# Patient Record
Sex: FEMALE | Race: WHITE | NOT HISPANIC OR LATINO | Employment: FULL TIME | URBAN - METROPOLITAN AREA
[De-identification: names, ages, dates, MRNs, and addresses within clinical notes are randomized per-mention and may not be internally consistent; named-entity substitution may affect disease eponyms.]

---

## 2021-01-19 ENCOUNTER — OFFICE VISIT (OUTPATIENT)
Dept: OBSTETRICS AND GYNECOLOGY | Facility: CLINIC | Age: 41
End: 2021-01-19
Payer: COMMERCIAL

## 2021-01-19 VITALS — WEIGHT: 109.19 LBS | BODY MASS INDEX: 21.44 KG/M2 | HEIGHT: 60 IN

## 2021-01-19 DIAGNOSIS — Z01.419 ENCOUNTER FOR ANNUAL ROUTINE GYNECOLOGICAL EXAMINATION: Primary | ICD-10-CM

## 2021-01-19 DIAGNOSIS — Z30.431 ENCOUNTER FOR ROUTINE CHECKING OF INTRAUTERINE CONTRACEPTIVE DEVICE (IUD): ICD-10-CM

## 2021-01-19 DIAGNOSIS — R92.30 DENSE BREAST TISSUE ON MAMMOGRAM: ICD-10-CM

## 2021-01-19 DIAGNOSIS — N63.25 BREAST LUMP ON LEFT SIDE AT 9 O'CLOCK POSITION: ICD-10-CM

## 2021-01-19 PROBLEM — J45.20 MILD INTERMITTENT ASTHMA WITHOUT COMPLICATION: Status: ACTIVE | Noted: 2021-01-19

## 2021-01-19 PROCEDURE — 87624 HPV HI-RISK TYP POOLED RSLT: CPT

## 2021-01-19 PROCEDURE — 88175 CYTOPATH C/V AUTO FLUID REDO: CPT

## 2021-01-19 PROCEDURE — 99999 PR PBB SHADOW E&M-NEW PATIENT-LVL III: CPT | Mod: PBBFAC,,, | Performed by: OBSTETRICS & GYNECOLOGY

## 2021-01-19 PROCEDURE — 1126F PR PAIN SEVERITY QUANTIFIED, NO PAIN PRESENT: ICD-10-PCS | Mod: S$GLB,,, | Performed by: OBSTETRICS & GYNECOLOGY

## 2021-01-19 PROCEDURE — 99386 PR PREVENTIVE VISIT,NEW,40-64: ICD-10-PCS | Mod: S$GLB,,, | Performed by: OBSTETRICS & GYNECOLOGY

## 2021-01-19 PROCEDURE — 3008F BODY MASS INDEX DOCD: CPT | Mod: CPTII,S$GLB,, | Performed by: OBSTETRICS & GYNECOLOGY

## 2021-01-19 PROCEDURE — 3008F PR BODY MASS INDEX (BMI) DOCUMENTED: ICD-10-PCS | Mod: CPTII,S$GLB,, | Performed by: OBSTETRICS & GYNECOLOGY

## 2021-01-19 PROCEDURE — 1126F AMNT PAIN NOTED NONE PRSNT: CPT | Mod: S$GLB,,, | Performed by: OBSTETRICS & GYNECOLOGY

## 2021-01-19 PROCEDURE — 99999 PR PBB SHADOW E&M-NEW PATIENT-LVL III: ICD-10-PCS | Mod: PBBFAC,,, | Performed by: OBSTETRICS & GYNECOLOGY

## 2021-01-19 PROCEDURE — 99386 PREV VISIT NEW AGE 40-64: CPT | Mod: S$GLB,,, | Performed by: OBSTETRICS & GYNECOLOGY

## 2021-01-19 RX ORDER — MULTIVITAMIN
1 TABLET ORAL DAILY
COMMUNITY

## 2021-01-19 RX ORDER — LORAZEPAM 0.5 MG/1
0.5 TABLET ORAL DAILY PRN
COMMUNITY
End: 2023-04-14

## 2021-01-25 LAB
HPV HR 12 DNA SPEC QL NAA+PROBE: NEGATIVE
HPV16 AG SPEC QL: NEGATIVE
HPV18 DNA SPEC QL NAA+PROBE: NEGATIVE

## 2021-02-11 LAB
FINAL PATHOLOGIC DIAGNOSIS: NORMAL
Lab: NORMAL

## 2021-07-30 ENCOUNTER — PATIENT MESSAGE (OUTPATIENT)
Dept: OBSTETRICS AND GYNECOLOGY | Facility: CLINIC | Age: 41
End: 2021-07-30

## 2021-07-30 ENCOUNTER — TELEPHONE (OUTPATIENT)
Dept: OBSTETRICS AND GYNECOLOGY | Facility: CLINIC | Age: 41
End: 2021-07-30

## 2021-10-27 ENCOUNTER — OFFICE VISIT (OUTPATIENT)
Dept: INTERNAL MEDICINE | Facility: CLINIC | Age: 41
End: 2021-10-27
Payer: COMMERCIAL

## 2021-10-27 VITALS
HEIGHT: 60 IN | SYSTOLIC BLOOD PRESSURE: 118 MMHG | HEART RATE: 92 BPM | WEIGHT: 109.38 LBS | OXYGEN SATURATION: 98 % | BODY MASS INDEX: 21.47 KG/M2 | DIASTOLIC BLOOD PRESSURE: 78 MMHG

## 2021-10-27 DIAGNOSIS — Z01.89 ROUTINE LAB DRAW: ICD-10-CM

## 2021-10-27 DIAGNOSIS — Z12.31 ENCOUNTER FOR SCREENING MAMMOGRAM FOR MALIGNANT NEOPLASM OF BREAST: ICD-10-CM

## 2021-10-27 DIAGNOSIS — Z13.220 SCREENING CHOLESTEROL LEVEL: ICD-10-CM

## 2021-10-27 DIAGNOSIS — Z11.59 ENCOUNTER FOR HEPATITIS C SCREENING TEST FOR LOW RISK PATIENT: ICD-10-CM

## 2021-10-27 DIAGNOSIS — F41.1 GENERALIZED ANXIETY DISORDER: ICD-10-CM

## 2021-10-27 DIAGNOSIS — J45.20 MILD INTERMITTENT ASTHMA WITHOUT COMPLICATION: ICD-10-CM

## 2021-10-27 DIAGNOSIS — Z11.4 ENCOUNTER FOR SCREENING FOR HIV: ICD-10-CM

## 2021-10-27 DIAGNOSIS — Z00.00 ANNUAL PHYSICAL EXAM: Primary | ICD-10-CM

## 2021-10-27 PROCEDURE — 3074F SYST BP LT 130 MM HG: CPT | Mod: CPTII,S$GLB,, | Performed by: NURSE PRACTITIONER

## 2021-10-27 PROCEDURE — 1160F PR REVIEW ALL MEDS BY PRESCRIBER/CLIN PHARMACIST DOCUMENTED: ICD-10-PCS | Mod: CPTII,S$GLB,, | Performed by: NURSE PRACTITIONER

## 2021-10-27 PROCEDURE — 3008F BODY MASS INDEX DOCD: CPT | Mod: CPTII,S$GLB,, | Performed by: NURSE PRACTITIONER

## 2021-10-27 PROCEDURE — 99386 PR PREVENTIVE VISIT,NEW,40-64: ICD-10-PCS | Mod: S$GLB,,, | Performed by: NURSE PRACTITIONER

## 2021-10-27 PROCEDURE — 1159F MED LIST DOCD IN RCRD: CPT | Mod: CPTII,S$GLB,, | Performed by: NURSE PRACTITIONER

## 2021-10-27 PROCEDURE — 1159F PR MEDICATION LIST DOCUMENTED IN MEDICAL RECORD: ICD-10-PCS | Mod: CPTII,S$GLB,, | Performed by: NURSE PRACTITIONER

## 2021-10-27 PROCEDURE — 99999 PR PBB SHADOW E&M-EST. PATIENT-LVL IV: CPT | Mod: PBBFAC,,, | Performed by: NURSE PRACTITIONER

## 2021-10-27 PROCEDURE — 3078F DIAST BP <80 MM HG: CPT | Mod: CPTII,S$GLB,, | Performed by: NURSE PRACTITIONER

## 2021-10-27 PROCEDURE — 3008F PR BODY MASS INDEX (BMI) DOCUMENTED: ICD-10-PCS | Mod: CPTII,S$GLB,, | Performed by: NURSE PRACTITIONER

## 2021-10-27 PROCEDURE — 3074F PR MOST RECENT SYSTOLIC BLOOD PRESSURE < 130 MM HG: ICD-10-PCS | Mod: CPTII,S$GLB,, | Performed by: NURSE PRACTITIONER

## 2021-10-27 PROCEDURE — 3078F PR MOST RECENT DIASTOLIC BLOOD PRESSURE < 80 MM HG: ICD-10-PCS | Mod: CPTII,S$GLB,, | Performed by: NURSE PRACTITIONER

## 2021-10-27 PROCEDURE — 99999 PR PBB SHADOW E&M-EST. PATIENT-LVL IV: ICD-10-PCS | Mod: PBBFAC,,, | Performed by: NURSE PRACTITIONER

## 2021-10-27 PROCEDURE — 99386 PREV VISIT NEW AGE 40-64: CPT | Mod: S$GLB,,, | Performed by: NURSE PRACTITIONER

## 2021-10-27 PROCEDURE — 1160F RVW MEDS BY RX/DR IN RCRD: CPT | Mod: CPTII,S$GLB,, | Performed by: NURSE PRACTITIONER

## 2021-10-27 RX ORDER — ALBUTEROL SULFATE 90 UG/1
2 AEROSOL, METERED RESPIRATORY (INHALATION) EVERY 6 HOURS PRN
Qty: 18 G | Refills: 0 | Status: SHIPPED | OUTPATIENT
Start: 2021-10-27 | End: 2022-10-28 | Stop reason: SDUPTHER

## 2021-11-06 ENCOUNTER — LAB VISIT (OUTPATIENT)
Dept: LAB | Facility: HOSPITAL | Age: 41
End: 2021-11-06
Payer: COMMERCIAL

## 2021-11-06 DIAGNOSIS — Z11.59 ENCOUNTER FOR HEPATITIS C SCREENING TEST FOR LOW RISK PATIENT: ICD-10-CM

## 2021-11-06 DIAGNOSIS — Z00.00 ANNUAL PHYSICAL EXAM: ICD-10-CM

## 2021-11-06 DIAGNOSIS — Z11.4 ENCOUNTER FOR SCREENING FOR HIV: ICD-10-CM

## 2021-11-06 DIAGNOSIS — Z13.220 SCREENING CHOLESTEROL LEVEL: ICD-10-CM

## 2021-11-06 DIAGNOSIS — Z01.89 ROUTINE LAB DRAW: ICD-10-CM

## 2021-11-06 LAB
ALBUMIN SERPL BCP-MCNC: 4.3 G/DL (ref 3.5–5.2)
ALP SERPL-CCNC: 56 U/L (ref 55–135)
ALT SERPL W/O P-5'-P-CCNC: 10 U/L (ref 10–44)
ANION GAP SERPL CALC-SCNC: 11 MMOL/L (ref 8–16)
AST SERPL-CCNC: 14 U/L (ref 10–40)
BASOPHILS # BLD AUTO: 0.05 K/UL (ref 0–0.2)
BASOPHILS NFR BLD: 1.2 % (ref 0–1.9)
BILIRUB SERPL-MCNC: 0.9 MG/DL (ref 0.1–1)
BUN SERPL-MCNC: 11 MG/DL (ref 6–20)
CALCIUM SERPL-MCNC: 9.5 MG/DL (ref 8.7–10.5)
CHLORIDE SERPL-SCNC: 108 MMOL/L (ref 95–110)
CHOLEST SERPL-MCNC: 158 MG/DL (ref 120–199)
CHOLEST/HDLC SERPL: 2.8 {RATIO} (ref 2–5)
CO2 SERPL-SCNC: 22 MMOL/L (ref 23–29)
CREAT SERPL-MCNC: 0.7 MG/DL (ref 0.5–1.4)
DIFFERENTIAL METHOD: ABNORMAL
EOSINOPHIL # BLD AUTO: 0.3 K/UL (ref 0–0.5)
EOSINOPHIL NFR BLD: 6.9 % (ref 0–8)
ERYTHROCYTE [DISTWIDTH] IN BLOOD BY AUTOMATED COUNT: 12.2 % (ref 11.5–14.5)
EST. GFR  (AFRICAN AMERICAN): >60 ML/MIN/1.73 M^2
EST. GFR  (NON AFRICAN AMERICAN): >60 ML/MIN/1.73 M^2
GLUCOSE SERPL-MCNC: 84 MG/DL (ref 70–110)
HCT VFR BLD AUTO: 40.5 % (ref 37–48.5)
HDLC SERPL-MCNC: 56 MG/DL (ref 40–75)
HDLC SERPL: 35.4 % (ref 20–50)
HGB BLD-MCNC: 13.4 G/DL (ref 12–16)
IMM GRANULOCYTES # BLD AUTO: 0.01 K/UL (ref 0–0.04)
IMM GRANULOCYTES NFR BLD AUTO: 0.2 % (ref 0–0.5)
LDLC SERPL CALC-MCNC: 91.6 MG/DL (ref 63–159)
LYMPHOCYTES # BLD AUTO: 1.8 K/UL (ref 1–4.8)
LYMPHOCYTES NFR BLD: 42.6 % (ref 18–48)
MCH RBC QN AUTO: 30.6 PG (ref 27–31)
MCHC RBC AUTO-ENTMCNC: 33.1 G/DL (ref 32–36)
MCV RBC AUTO: 93 FL (ref 82–98)
MONOCYTES # BLD AUTO: 0.4 K/UL (ref 0.3–1)
MONOCYTES NFR BLD: 8.4 % (ref 4–15)
NEUTROPHILS # BLD AUTO: 1.7 K/UL (ref 1.8–7.7)
NEUTROPHILS NFR BLD: 40.7 % (ref 38–73)
NONHDLC SERPL-MCNC: 102 MG/DL
NRBC BLD-RTO: 0 /100 WBC
PLATELET # BLD AUTO: 277 K/UL (ref 150–450)
PMV BLD AUTO: 9.7 FL (ref 9.2–12.9)
POTASSIUM SERPL-SCNC: 4.3 MMOL/L (ref 3.5–5.1)
PROT SERPL-MCNC: 7.2 G/DL (ref 6–8.4)
RBC # BLD AUTO: 4.38 M/UL (ref 4–5.4)
SODIUM SERPL-SCNC: 141 MMOL/L (ref 136–145)
TRIGL SERPL-MCNC: 52 MG/DL (ref 30–150)
TSH SERPL DL<=0.005 MIU/L-ACNC: 1.56 UIU/ML (ref 0.4–4)
WBC # BLD AUTO: 4.18 K/UL (ref 3.9–12.7)

## 2021-11-06 PROCEDURE — 36415 COLL VENOUS BLD VENIPUNCTURE: CPT | Performed by: NURSE PRACTITIONER

## 2021-11-06 PROCEDURE — 80053 COMPREHEN METABOLIC PANEL: CPT | Performed by: NURSE PRACTITIONER

## 2021-11-06 PROCEDURE — 86803 HEPATITIS C AB TEST: CPT | Performed by: NURSE PRACTITIONER

## 2021-11-06 PROCEDURE — 87389 HIV-1 AG W/HIV-1&-2 AB AG IA: CPT | Performed by: NURSE PRACTITIONER

## 2021-11-06 PROCEDURE — 85025 COMPLETE CBC W/AUTO DIFF WBC: CPT | Performed by: NURSE PRACTITIONER

## 2021-11-06 PROCEDURE — 84443 ASSAY THYROID STIM HORMONE: CPT | Performed by: NURSE PRACTITIONER

## 2021-11-06 PROCEDURE — 80061 LIPID PANEL: CPT | Performed by: NURSE PRACTITIONER

## 2021-11-08 LAB
HCV AB SERPL QL IA: NEGATIVE
HIV 1+2 AB+HIV1 P24 AG SERPL QL IA: NEGATIVE

## 2021-11-09 ENCOUNTER — TELEPHONE (OUTPATIENT)
Dept: BEHAVIORAL HEALTH | Facility: CLINIC | Age: 41
End: 2021-11-09
Payer: COMMERCIAL

## 2021-11-10 ENCOUNTER — PATIENT MESSAGE (OUTPATIENT)
Dept: BEHAVIORAL HEALTH | Facility: CLINIC | Age: 41
End: 2021-11-10
Payer: COMMERCIAL

## 2021-11-23 ENCOUNTER — HOSPITAL ENCOUNTER (OUTPATIENT)
Dept: RADIOLOGY | Facility: HOSPITAL | Age: 41
Discharge: HOME | End: 2021-11-23
Attending: NURSE PRACTITIONER
Payer: COMMERCIAL

## 2021-11-23 VITALS — HEIGHT: 61 IN | WEIGHT: 111 LBS | BODY MASS INDEX: 20.96 KG/M2

## 2021-11-23 DIAGNOSIS — Z12.31 ENCOUNTER FOR SCREENING MAMMOGRAM FOR MALIGNANT NEOPLASM OF BREAST: ICD-10-CM

## 2021-11-23 PROCEDURE — 77063 BREAST TOMOSYNTHESIS BI: CPT | Mod: 26,,, | Performed by: RADIOLOGY

## 2021-11-23 PROCEDURE — 77067 SCR MAMMO BI INCL CAD: CPT | Mod: 26,,, | Performed by: RADIOLOGY

## 2021-11-23 PROCEDURE — 77067 MAMMO DIGITAL SCREENING BILAT WITH TOMO: ICD-10-PCS | Mod: 26,,, | Performed by: RADIOLOGY

## 2021-11-23 PROCEDURE — 77063 MAMMO DIGITAL SCREENING BILAT WITH TOMO: ICD-10-PCS | Mod: 26,,, | Performed by: RADIOLOGY

## 2021-11-23 PROCEDURE — 77067 SCR MAMMO BI INCL CAD: CPT | Mod: TC

## 2021-12-06 ENCOUNTER — PATIENT MESSAGE (OUTPATIENT)
Dept: BEHAVIORAL HEALTH | Facility: CLINIC | Age: 41
End: 2021-12-06
Payer: COMMERCIAL

## 2021-12-06 ENCOUNTER — TELEPHONE (OUTPATIENT)
Dept: RADIOLOGY | Facility: HOSPITAL | Age: 41
End: 2021-12-06
Payer: COMMERCIAL

## 2021-12-07 ENCOUNTER — PATIENT MESSAGE (OUTPATIENT)
Dept: BEHAVIORAL HEALTH | Facility: CLINIC | Age: 41
End: 2021-12-07
Payer: COMMERCIAL

## 2021-12-08 ENCOUNTER — OFFICE VISIT (OUTPATIENT)
Dept: BEHAVIORAL HEALTH | Facility: CLINIC | Age: 41
End: 2021-12-08
Payer: COMMERCIAL

## 2021-12-08 DIAGNOSIS — F41.1 GENERALIZED ANXIETY DISORDER: Primary | ICD-10-CM

## 2021-12-08 PROCEDURE — 90791 PR PSYCHIATRIC DIAGNOSTIC EVALUATION: ICD-10-PCS | Mod: 95,,, | Performed by: SOCIAL WORKER

## 2021-12-08 PROCEDURE — 90791 PSYCH DIAGNOSTIC EVALUATION: CPT | Mod: 95,,, | Performed by: SOCIAL WORKER

## 2021-12-09 ENCOUNTER — PATIENT MESSAGE (OUTPATIENT)
Dept: BEHAVIORAL HEALTH | Facility: CLINIC | Age: 41
End: 2021-12-09
Payer: COMMERCIAL

## 2021-12-16 ENCOUNTER — TELEPHONE (OUTPATIENT)
Dept: BEHAVIORAL HEALTH | Facility: CLINIC | Age: 41
End: 2021-12-16
Payer: COMMERCIAL

## 2021-12-17 DIAGNOSIS — F41.1 GENERALIZED ANXIETY DISORDER: Primary | ICD-10-CM

## 2021-12-17 RX ORDER — SERTRALINE HYDROCHLORIDE 25 MG/1
TABLET, FILM COATED ORAL
Qty: 30 TABLET | Refills: 2 | Status: SHIPPED | OUTPATIENT
Start: 2021-12-17 | End: 2022-01-12

## 2021-12-20 ENCOUNTER — PATIENT MESSAGE (OUTPATIENT)
Dept: BEHAVIORAL HEALTH | Facility: CLINIC | Age: 41
End: 2021-12-20
Payer: COMMERCIAL

## 2021-12-20 ENCOUNTER — OFFICE VISIT (OUTPATIENT)
Dept: BEHAVIORAL HEALTH | Facility: CLINIC | Age: 41
End: 2021-12-20
Payer: COMMERCIAL

## 2021-12-20 DIAGNOSIS — F41.1 GENERALIZED ANXIETY DISORDER: Primary | ICD-10-CM

## 2021-12-20 PROCEDURE — 90832 PR PSYCHOTHERAPY W/PATIENT, 30 MIN: ICD-10-PCS | Mod: S$GLB,,, | Performed by: SOCIAL WORKER

## 2021-12-20 PROCEDURE — 90832 PSYTX W PT 30 MINUTES: CPT | Mod: S$GLB,,, | Performed by: SOCIAL WORKER

## 2021-12-21 ENCOUNTER — PATIENT MESSAGE (OUTPATIENT)
Dept: BEHAVIORAL HEALTH | Facility: CLINIC | Age: 41
End: 2021-12-21
Payer: COMMERCIAL

## 2022-01-03 ENCOUNTER — OFFICE VISIT (OUTPATIENT)
Dept: BEHAVIORAL HEALTH | Facility: CLINIC | Age: 42
End: 2022-01-03
Payer: COMMERCIAL

## 2022-01-03 ENCOUNTER — HOSPITAL ENCOUNTER (OUTPATIENT)
Dept: RADIOLOGY | Facility: HOSPITAL | Age: 42
Discharge: HOME | End: 2022-01-03
Attending: NURSE PRACTITIONER
Payer: COMMERCIAL

## 2022-01-03 VITALS — BODY MASS INDEX: 20.6 KG/M2 | WEIGHT: 109 LBS

## 2022-01-03 DIAGNOSIS — F41.1 GENERALIZED ANXIETY DISORDER: Primary | ICD-10-CM

## 2022-01-03 DIAGNOSIS — R92.8 ABNORMAL FINDING ON BREAST IMAGING: ICD-10-CM

## 2022-01-03 PROCEDURE — 76642 ULTRASOUND BREAST LIMITED: CPT | Mod: TC,50

## 2022-01-03 PROCEDURE — 76642 US BREAST BILATERAL LIMITED: ICD-10-PCS | Mod: 26,RT,, | Performed by: RADIOLOGY

## 2022-01-03 PROCEDURE — 76642 ULTRASOUND BREAST LIMITED: CPT | Mod: 26,LT,, | Performed by: RADIOLOGY

## 2022-01-03 PROCEDURE — 90832 PR PSYCHOTHERAPY W/PATIENT, 30 MIN: ICD-10-PCS | Mod: S$GLB,,, | Performed by: SOCIAL WORKER

## 2022-01-03 PROCEDURE — 77061 BREAST TOMOSYNTHESIS UNI: CPT | Mod: 26,RT,, | Performed by: RADIOLOGY

## 2022-01-03 PROCEDURE — 77065 MAMMO DIGITAL DIAGNOSTIC RIGHT WITH TOMO: ICD-10-PCS | Mod: 26,RT,, | Performed by: RADIOLOGY

## 2022-01-03 PROCEDURE — 90832 PSYTX W PT 30 MINUTES: CPT | Mod: S$GLB,,, | Performed by: SOCIAL WORKER

## 2022-01-03 PROCEDURE — 77065 DX MAMMO INCL CAD UNI: CPT | Mod: TC,RT

## 2022-01-03 PROCEDURE — 77061 MAMMO DIGITAL DIAGNOSTIC RIGHT WITH TOMO: ICD-10-PCS | Mod: 26,RT,, | Performed by: RADIOLOGY

## 2022-01-03 PROCEDURE — 76642 ULTRASOUND BREAST LIMITED: CPT | Mod: 26,RT,, | Performed by: RADIOLOGY

## 2022-01-03 PROCEDURE — 77065 DX MAMMO INCL CAD UNI: CPT | Mod: 26,RT,, | Performed by: RADIOLOGY

## 2022-01-03 NOTE — PROGRESS NOTES
"Individual Psychotherapy (LCSW/PhD)  Tobi Dennies,  1/3/2022    Site:  [facility]         Therapeutic Intervention: Met with patient for individual psychotherapy.    Chief complaint/reason for encounter: anxiety     Interval history and content of current session: Pt reports her parents moved out one day without telling her before hand.  She felt somewhat upset about this.  She shares she doesn't like being alone and so not having them there is a change.  She shares she has been taking zoloft since it was prescribed but that it has caused heart palpitations.  She reports they are not as bad as they were on lexapro but that they are disturbing and do not seem to be getting better.  She shares she set the goal for the new year to move more and got a hula hoop to help with this.  She reports she has cut back on caffeine use and is back to her sleep hygiene routine of only using bed for sleep.  She would like to try another medication.  She reports she has been doing a meditation when she gets home of imagining herself somewhere calming which she finds helpful.  We discussed changing "what if " statements to a positive what if scenario.  SW will discuss meds with Dr. Estrella    Treatment plan:  · Target symptoms: anxiety   · Why chosen therapy is appropriate versus another modality: relevant to diagnosis, patient responds to this modality  · Outcome monitoring methods: self-report, observation  · Therapeutic intervention type: insight oriented psychotherapy, behavior modifying psychotherapy, supportive psychotherapy    Risk parameters:  Patient reports no suicidal ideation  Patient reports no homicidal ideation  Patient reports no self-injurious behavior  Patient reports no violent behavior    Verbal deficits: None    Patient's response to intervention:  The patient's response to intervention is accepting.    Progress toward goals and other mental status changes:  The patient's progress toward goals is fair " .    Diagnosis:     ICD-10-CM ICD-9-CM   1. Generalized anxiety disorder  F41.1 300.02       Plan: Pt plans to continue individual psychotherapy    Return to clinic: 2 weeks    Length of Service (minutes): 30

## 2022-01-03 NOTE — Clinical Note
Dr. Mccartney,  Please see my addendum from today's I meeting.  If you agree with this plan, I will pend Buspar in a separate refill encounter.  Please reach out to me with any questions.  Jo Estrella

## 2022-01-04 ENCOUNTER — TELEPHONE (OUTPATIENT)
Dept: BEHAVIORAL HEALTH | Facility: CLINIC | Age: 42
End: 2022-01-04
Payer: COMMERCIAL

## 2022-01-04 NOTE — PROGRESS NOTES
VEGAW scheduled pt on 1/19/2022 for follow up virtual visit at 2:00pm with Sugar Griffiths LCSW.

## 2022-01-07 DIAGNOSIS — F41.1 GENERALIZED ANXIETY DISORDER: Primary | ICD-10-CM

## 2022-01-07 NOTE — PROGRESS NOTES
Patient discussed during [facility] meeting today.  Patient with history of palpitations on Lexapro.  She has now been on Zoloft 12.5mg daily x 1 month and continues to have palpitations.  Will stop Zoloft and now try Buspar.  Would recommend prescribing Buspar 5mg three times a day with the goal of taking at least twice a day.  She can use the 3rd dose as needed for days with high anxiety.  She will continue therapy with [facility] LCSW.      Time: 12 minutes

## 2022-01-10 RX ORDER — BUSPIRONE HYDROCHLORIDE 5 MG/1
5 TABLET ORAL 3 TIMES DAILY
Qty: 90 TABLET | Refills: 2 | Status: SHIPPED | OUTPATIENT
Start: 2022-01-10 | End: 2022-02-25 | Stop reason: SDUPTHER

## 2022-01-11 ENCOUNTER — TELEPHONE (OUTPATIENT)
Dept: BEHAVIORAL HEALTH | Facility: CLINIC | Age: 42
End: 2022-01-11
Payer: COMMERCIAL

## 2022-01-11 NOTE — TELEPHONE ENCOUNTER
LCSW called pt to let her know that Dr. Mccartney called in the prescription for buspirone as recommended by Dr. Estrella.  SW provided brief psychoeducation as instructed by Dr. Estrella for pt to take 2 buspirone around her anxious times of day and to take the 3rd dose prn.  Advised pt to call or message with any concerns.  LCSW will see pt next week for therapy.

## 2022-01-12 ENCOUNTER — OFFICE VISIT (OUTPATIENT)
Dept: INTERNAL MEDICINE | Facility: CLINIC | Age: 42
End: 2022-01-12
Payer: COMMERCIAL

## 2022-01-12 VITALS
BODY MASS INDEX: 20.77 KG/M2 | SYSTOLIC BLOOD PRESSURE: 116 MMHG | TEMPERATURE: 98 F | DIASTOLIC BLOOD PRESSURE: 70 MMHG | RESPIRATION RATE: 16 BRPM | OXYGEN SATURATION: 99 % | HEIGHT: 61 IN | WEIGHT: 110 LBS | HEART RATE: 100 BPM

## 2022-01-12 DIAGNOSIS — J45.20 MILD INTERMITTENT ASTHMA WITHOUT COMPLICATION: ICD-10-CM

## 2022-01-12 DIAGNOSIS — Z76.89 ENCOUNTER TO ESTABLISH CARE WITH NEW DOCTOR: Primary | ICD-10-CM

## 2022-01-12 DIAGNOSIS — F41.1 GENERALIZED ANXIETY DISORDER: ICD-10-CM

## 2022-01-12 DIAGNOSIS — G47.00 INSOMNIA, UNSPECIFIED TYPE: ICD-10-CM

## 2022-01-12 DIAGNOSIS — F90.9 ATTENTION DEFICIT HYPERACTIVITY DISORDER (ADHD), UNSPECIFIED ADHD TYPE: ICD-10-CM

## 2022-01-12 DIAGNOSIS — J30.2 SEASONAL ALLERGIES: ICD-10-CM

## 2022-01-12 PROCEDURE — 99386 PR PREVENTIVE VISIT,NEW,40-64: ICD-10-PCS | Mod: S$GLB,,, | Performed by: STUDENT IN AN ORGANIZED HEALTH CARE EDUCATION/TRAINING PROGRAM

## 2022-01-12 PROCEDURE — 99386 PREV VISIT NEW AGE 40-64: CPT | Mod: S$GLB,,, | Performed by: STUDENT IN AN ORGANIZED HEALTH CARE EDUCATION/TRAINING PROGRAM

## 2022-01-12 PROCEDURE — 99999 PR PBB SHADOW E&M-EST. PATIENT-LVL V: CPT | Mod: PBBFAC,,, | Performed by: STUDENT IN AN ORGANIZED HEALTH CARE EDUCATION/TRAINING PROGRAM

## 2022-01-12 PROCEDURE — 3008F PR BODY MASS INDEX (BMI) DOCUMENTED: ICD-10-PCS | Mod: CPTII,S$GLB,, | Performed by: STUDENT IN AN ORGANIZED HEALTH CARE EDUCATION/TRAINING PROGRAM

## 2022-01-12 PROCEDURE — 3074F SYST BP LT 130 MM HG: CPT | Mod: CPTII,S$GLB,, | Performed by: STUDENT IN AN ORGANIZED HEALTH CARE EDUCATION/TRAINING PROGRAM

## 2022-01-12 PROCEDURE — 1159F MED LIST DOCD IN RCRD: CPT | Mod: CPTII,S$GLB,, | Performed by: STUDENT IN AN ORGANIZED HEALTH CARE EDUCATION/TRAINING PROGRAM

## 2022-01-12 PROCEDURE — 3078F DIAST BP <80 MM HG: CPT | Mod: CPTII,S$GLB,, | Performed by: STUDENT IN AN ORGANIZED HEALTH CARE EDUCATION/TRAINING PROGRAM

## 2022-01-12 PROCEDURE — 1159F PR MEDICATION LIST DOCUMENTED IN MEDICAL RECORD: ICD-10-PCS | Mod: CPTII,S$GLB,, | Performed by: STUDENT IN AN ORGANIZED HEALTH CARE EDUCATION/TRAINING PROGRAM

## 2022-01-12 PROCEDURE — 1160F PR REVIEW ALL MEDS BY PRESCRIBER/CLIN PHARMACIST DOCUMENTED: ICD-10-PCS | Mod: CPTII,S$GLB,, | Performed by: STUDENT IN AN ORGANIZED HEALTH CARE EDUCATION/TRAINING PROGRAM

## 2022-01-12 PROCEDURE — 3008F BODY MASS INDEX DOCD: CPT | Mod: CPTII,S$GLB,, | Performed by: STUDENT IN AN ORGANIZED HEALTH CARE EDUCATION/TRAINING PROGRAM

## 2022-01-12 PROCEDURE — 3078F PR MOST RECENT DIASTOLIC BLOOD PRESSURE < 80 MM HG: ICD-10-PCS | Mod: CPTII,S$GLB,, | Performed by: STUDENT IN AN ORGANIZED HEALTH CARE EDUCATION/TRAINING PROGRAM

## 2022-01-12 PROCEDURE — 99999 PR PBB SHADOW E&M-EST. PATIENT-LVL V: ICD-10-PCS | Mod: PBBFAC,,, | Performed by: STUDENT IN AN ORGANIZED HEALTH CARE EDUCATION/TRAINING PROGRAM

## 2022-01-12 PROCEDURE — 1160F RVW MEDS BY RX/DR IN RCRD: CPT | Mod: CPTII,S$GLB,, | Performed by: STUDENT IN AN ORGANIZED HEALTH CARE EDUCATION/TRAINING PROGRAM

## 2022-01-12 PROCEDURE — 3074F PR MOST RECENT SYSTOLIC BLOOD PRESSURE < 130 MM HG: ICD-10-PCS | Mod: CPTII,S$GLB,, | Performed by: STUDENT IN AN ORGANIZED HEALTH CARE EDUCATION/TRAINING PROGRAM

## 2022-01-12 RX ORDER — TRAZODONE HYDROCHLORIDE 50 MG/1
50 TABLET ORAL NIGHTLY PRN
Qty: 30 TABLET | Refills: 11 | Status: SHIPPED | OUTPATIENT
Start: 2022-01-12 | End: 2023-07-13

## 2022-01-12 NOTE — PROGRESS NOTES
INTERNAL MEDICINE INITIAL VISIT NOTE      CHIEF COMPLAINT     Chief Complaint   Patient presents with    Results       ASSESSMENT/PLAN     Tobi Dennies is a 41 y.o. female with asthma, allergies, mild anxiety, insomnia here to establish care.     1. Encounter to establish care with new doctor  All previous labs, imaging, and notes reviewed up to the time point of this note. Normal labs, slightly low ANC 1700, but no need to repeat at this time.     2. Mild intermittent asthma without complication  Well controlled with rare use of albuterol    3. Seasonal allergies  PRN use of antihistamines.     4. Generalized anxiety disorder  Recently started on buspar 5 mg BID with 1 daily pill for PRN use. Did not tolerate zoloft or lexapro. LYLY-9 score 9 (mild)    5. Insomnia, unspecified type  Not sleeping well, likely 2/2 to anxiety.   - traZODone (DESYREL) 50 MG tablet; Take 1 tablet (50 mg total) by mouth nightly as needed for Insomnia.  Dispense: 30 tablet; Refill: 11    6. Attention deficit hyperactivity disorder (ADHD), unspecified ADHD type  Concerned that ADHD may be a leading factor to her anxiety and trouble focusing at work. Need for evaluation for testing.   - Ambulatory referral/consult to Psychiatry; Future      HM reviewed and discussed in detail with the patient.     RTC in 1 yr, sooner if needed and depending on labs.    HPI     Tobi Dennies is a 41 y.o. female who presents with anxiety, intermittent asthma, here today to establish care.    Last PCP was essentially her ENT due to issues with allergies and asthma in the past. Has since retired.     Birth control  - had IUD placed late 2017. Is due for getting it replaced late 2024. Gets some spotting when stressed or when daughter is on her period.     Allergies - seasonal. If it gets bad, can lead to asthma flare. Will take steroid injection and prednisone as well as z-pack.     Asthma - triggered by allergies and infections as well as anxiety. Has never  "been hospitalized overnight for asthma. Typically once a year maximum. Uses albuterol once a month on average.     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 No panic attacks. Has been seeing a psychiatrist for just a few months, previous PCP was just prescribing ativan for anxiety.   Had SE to zoloft. Just started on buspar yesterday. LYLY-7 score 9.     Diet: ok  Exercise: "I need to do more"  Sleep - trouble going to sleep, relaxing an issue. Wakes up in the middle of the night. Took melatonin for a while, but now issues with waking up at 2 am.   BM: regular   Sunscreen: yes  Safety: yes     Past Medical History:  Past Medical History:   Diagnosis Date    Anxiety 2010    Asthma     age 4       Past Surgical History:  Past Surgical History:   Procedure Laterality Date    cyst removal of scalp         Home Medications:  Prior to Admission medications    Medication Sig Start Date End Date Taking? Authorizing Provider   albuterol (VENTOLIN HFA) 90 mcg/actuation inhaler Inhale 2 puffs into the lungs every 6 (six) hours as needed for Wheezing. Rescue 10/27/21 10/27/22  Beatriz Reddy NP   busPIRone (BUSPAR) 5 MG Tab Take 1 tablet (5 mg total) by mouth 3 (three) times daily. 1/10/22 1/10/23  Rochelle Mccartney MD   levonorgestreL (MIRENA) 20 mcg/24 hours (6 yrs) 52 mg IUD 1 each by Intrauterine route once.    Historical Provider   LORazepam (ATIVAN) 0.5 MG tablet Take 0.5 mg by mouth daily as needed for Anxiety.    Historical Provider   multivitamin (THERAGRAN) per tablet Take 1 tablet by mouth once daily.    Historical Provider   sertraline (ZOLOFT) 25 MG tablet Take 12.5mg (1/2 tablet) by mouth daily x 1 week and then increase to 25mg (1 tablet) daily. 12/17/21   Rochelle Mccartney MD       Allergies:  Review of patient's allergies indicates:  No Known Allergies    Family History:  Family " "History   Adopted: Yes   Problem Relation Age of Onset    ADD / ADHD Daughter 8       Social History:  Social History     Tobacco Use    Smoking status: Never Smoker    Smokeless tobacco: Never Used   Substance Use Topics    Alcohol use: Yes     Alcohol/week: 2.0 standard drinks     Types: 2 Standard drinks or equivalent per week     Comment: socially; twice per month    Drug use: Never       Review of Systems:  Review of Systems   Constitutional: Negative for fever and unexpected weight change.   HENT: Negative for sinus pressure and sore throat.    Eyes: Negative for visual disturbance.   Respiratory: Negative for cough and shortness of breath.    Cardiovascular: Negative for chest pain and palpitations.   Gastrointestinal: Negative for constipation, diarrhea, nausea and vomiting.   Endocrine: Negative for polyuria.   Genitourinary: Negative for dysuria and urgency.   Musculoskeletal: Negative for arthralgias and myalgias.   Skin: Negative for rash.   Allergic/Immunologic: Negative for environmental allergies.   Neurological: Negative for dizziness, light-headedness and headaches.   Hematological: Does not bruise/bleed easily.   Psychiatric/Behavioral: Positive for sleep disturbance. Negative for agitation and decreased concentration. The patient is nervous/anxious.        Health Maintenance:   Needs to schedule covid booster      PHYSICAL EXAM     /70 (BP Location: Right arm, Patient Position: Sitting, BP Method: Small (Manual))   Pulse 100   Temp 98 °F (36.7 °C) (Oral)   Resp 16   Ht 5' 1" (1.549 m)   Wt 49.9 kg (110 lb 0.2 oz)   SpO2 99%   BMI 20.79 kg/m²     GEN - A+OX4, NAD healthy and well appearing middle aged woman   HEENT - PERRL, EOMI, OP clear  Neck - No thyromegaly or thyroid masses felt.  No cervical lymphadenopathy appreciated.  CV - RRR, no m/r/g   Chest - CTAB, no wheezing, crackles, or rhonchi   Abd - S/NT/ND/+BS.   Ext - 2+BDP. No C/C/E.  LN - No LAD appreciated.  Skin - Normal " color and texture, no rash, no skin lesions.      LABS     Lab Results   Component Value Date    WBC 4.18 11/06/2021    HGB 13.4 11/06/2021    HCT 40.5 11/06/2021    MCV 93 11/06/2021     11/06/2021     Sodium   Date Value Ref Range Status   11/06/2021 141 136 - 145 mmol/L Final     Potassium   Date Value Ref Range Status   11/06/2021 4.3 3.5 - 5.1 mmol/L Final     Chloride   Date Value Ref Range Status   11/06/2021 108 95 - 110 mmol/L Final     CO2   Date Value Ref Range Status   11/06/2021 22 (L) 23 - 29 mmol/L Final     Glucose   Date Value Ref Range Status   11/06/2021 84 70 - 110 mg/dL Final     BUN   Date Value Ref Range Status   11/06/2021 11 6 - 20 mg/dL Final     Creatinine   Date Value Ref Range Status   11/06/2021 0.7 0.5 - 1.4 mg/dL Final     Calcium   Date Value Ref Range Status   11/06/2021 9.5 8.7 - 10.5 mg/dL Final     Total Protein   Date Value Ref Range Status   11/06/2021 7.2 6.0 - 8.4 g/dL Final     Albumin   Date Value Ref Range Status   11/06/2021 4.3 3.5 - 5.2 g/dL Final     Total Bilirubin   Date Value Ref Range Status   11/06/2021 0.9 0.1 - 1.0 mg/dL Final     Comment:     For infants and newborns, interpretation of results should be based  on gestational age, weight and in agreement with clinical  observations.    Premature Infant recommended reference ranges:  Up to 24 hours.............<8.0 mg/dL  Up to 48 hours............<12.0 mg/dL  3-5 days..................<15.0 mg/dL  6-29 days.................<15.0 mg/dL       Alkaline Phosphatase   Date Value Ref Range Status   11/06/2021 56 55 - 135 U/L Final     AST   Date Value Ref Range Status   11/06/2021 14 10 - 40 U/L Final     ALT   Date Value Ref Range Status   11/06/2021 10 10 - 44 U/L Final     Anion Gap   Date Value Ref Range Status   11/06/2021 11 8 - 16 mmol/L Final     eGFR if    Date Value Ref Range Status   11/06/2021 >60.0 >60 mL/min/1.73 m^2 Final     eGFR if non    Date Value Ref Range  Status   11/06/2021 >60.0 >60 mL/min/1.73 m^2 Final     Comment:     Calculation used to obtain the estimated glomerular filtration  rate (eGFR) is the CKD-EPI equation.        No results found for: LABA1C, HGBA1C  Lab Results   Component Value Date    LDLCALC 91.6 11/06/2021     Lab Results   Component Value Date    TSH 1.558 11/06/2021           Rochelle Mccartney MD   [facility]   Department of Internal Medicine   01/12/2022

## 2022-01-12 NOTE — PATIENT INSTRUCTIONS
Please call [phone] to schedule an appointment with psychiatry.  A referral has been placed.

## 2022-01-19 ENCOUNTER — PATIENT MESSAGE (OUTPATIENT)
Dept: BEHAVIORAL HEALTH | Facility: CLINIC | Age: 42
End: 2022-01-19

## 2022-01-19 ENCOUNTER — OFFICE VISIT (OUTPATIENT)
Dept: BEHAVIORAL HEALTH | Facility: CLINIC | Age: 42
End: 2022-01-19
Payer: COMMERCIAL

## 2022-01-19 DIAGNOSIS — F41.1 GENERALIZED ANXIETY DISORDER: Primary | ICD-10-CM

## 2022-01-19 PROCEDURE — 90832 PSYTX W PT 30 MINUTES: CPT | Mod: 95,,, | Performed by: SOCIAL WORKER

## 2022-01-19 PROCEDURE — 90832 PR PSYCHOTHERAPY W/PATIENT, 30 MIN: ICD-10-PCS | Mod: 95,,, | Performed by: SOCIAL WORKER

## 2022-01-19 NOTE — PROGRESS NOTES
The patient location is: [place]  The chief complaint leading to consultation is: anxiety    Visit type: audiovisual    Face to Face time with patient: 30  35 minutes of total time spent on the encounter, which includes face to face time and non-face to face time preparing to see the patient (eg, review of tests), Obtaining and/or reviewing separately obtained history, Documenting clinical information in the electronic or other health record, Independently interpreting results (not separately reported) and communicating results to the patient/family/caregiver, or Care coordination (not separately reported).     Individual Psychotherapy (LCSW/PhD)  Tobi Dennies,  1/19/2022    Site:  Telemed         Therapeutic Intervention: Met with patient for individual psychotherapy.    Chief complaint/reason for encounter: anxiety     Interval history and content of current session: Pt reports she feels the buspirone is working for her.  She takes it in the morning before work and then after work.  She notes that she isn't having heart palpitations like she did with the other medications and feels less anxious.  She is still having difficulty focusing on tasks especially at home.  SW and pt discussed utilizing leaves on a stream meditation and when she feels distracted by another chore at home acknowledging that thought as just a leaf on a stream and let it pass by.  Also discussed setting small areas to work on in the house when doing chores to stay focused.  SW praised pt for gains made. Also encouraged pt to call psychiatry to get set up with an appointment for an ADHD eval if needed in the future.    Treatment plan:  · Target symptoms: anxiety   · Why chosen therapy is appropriate versus another modality: relevant to diagnosis, patient responds to this modality  · Outcome monitoring methods: self-report, observation  · Therapeutic intervention type: insight oriented psychotherapy, behavior modifying psychotherapy,  supportive psychotherapy    Risk parameters:  Patient reports no suicidal ideation  Patient reports no homicidal ideation  Patient reports no self-injurious behavior  Patient reports no violent behavior    Verbal deficits: None    Patient's response to intervention:  The patient's response to intervention is motivated.    Progress toward goals and other mental status changes:  The patient's progress toward goals is good.    Diagnosis:     ICD-10-CM ICD-9-CM   1. Generalized anxiety disorder  F41.1 300.02       Plan: Pt plans to continue individual psychotherapy    Return to clinic: 2 weeks    Length of Service (minutes): 30        Each patient to whom he or she provides medical services by telemedicine is:  (1) informed of the relationship between the physician and patient and the respective role of any other health care provider with respect to management of the patient; and (2) notified that he or she may decline to receive medical services by telemedicine and may withdraw from such care at any time.

## 2022-02-02 ENCOUNTER — PATIENT MESSAGE (OUTPATIENT)
Dept: BEHAVIORAL HEALTH | Facility: CLINIC | Age: 42
End: 2022-02-02

## 2022-02-02 ENCOUNTER — OFFICE VISIT (OUTPATIENT)
Dept: BEHAVIORAL HEALTH | Facility: CLINIC | Age: 42
End: 2022-02-02
Payer: COMMERCIAL

## 2022-02-02 DIAGNOSIS — F41.1 GENERALIZED ANXIETY DISORDER: Primary | ICD-10-CM

## 2022-02-02 PROCEDURE — 90832 PSYTX W PT 30 MINUTES: CPT | Mod: 95,,, | Performed by: SOCIAL WORKER

## 2022-02-02 PROCEDURE — 90832 PR PSYCHOTHERAPY W/PATIENT, 30 MIN: ICD-10-PCS | Mod: 95,,, | Performed by: SOCIAL WORKER

## 2022-02-02 NOTE — PROGRESS NOTES
The patient location is: [place]  The chief complaint leading to consultation is: anxiety    Visit type: audiovisual    Face to Face time with patient: 30  35 minutes of total time spent on the encounter, which includes face to face time and non-face to face time preparing to see the patient (eg, review of tests), Obtaining and/or reviewing separately obtained history, Documenting clinical information in the electronic or other health record, Independently interpreting results (not separately reported) and communicating results to the patient/family/caregiver, or Care coordination (not separately reported).     Individual Psychotherapy (LCSW/PhD)  Tobi Dennies,  2/2/2022    Site:  Telemed         Therapeutic Intervention: Met with patient for individual psychotherapy.    Chief complaint/reason for encounter: anxiety     Interval history and content of current session: Pt reports she feels the buspirone is really helping.  She feels less anxious and is able to focus more on housework and not get distracted.  She is having trouble with sleep especially falling asleep.  She reports her PCP gave her trazadone but she googled about the interaction between trazadone and buspirone and read something that stated these should not be taken together.  SW told pt I would discuss concerns with Dr. Estrella.  She has taken melatonin which also helps with sleep.  She notes she stays up on her phone until past the time she should go to bed.  She states she knows she shouldn't do this.  Discussed with pt revenge sleep procrastination and gave pt an article of ways to cope with this behavior such as scheduling time to be on her phone.  Pt to continue with meditation and exercise to help mood.  Updated PHQ and LYLY  Gad7= 7 (previous score of 12)  PHQ9=5      Treatment plan:  · Target symptoms: anxiety   · Why chosen therapy is appropriate versus another modality: relevant to diagnosis, patient responds to this modality, evidence based  practice  · Outcome monitoring methods: self-report, observation, psychological tests  · Therapeutic intervention type: insight oriented psychotherapy, behavior modifying psychotherapy, interactive psychotherapy    Risk parameters:  Patient reports no suicidal ideation  Patient reports no homicidal ideation  Patient reports no self-injurious behavior  Patient reports no violent behavior    Verbal deficits: None    Patient's response to intervention:  The patient's response to intervention is motivated.    Progress toward goals and other mental status changes:  The patient's progress toward goals is good.    Diagnosis:     ICD-10-CM ICD-9-CM   1. Generalized anxiety disorder  F41.1 300.02       Plan: Pt plans to continue individual psychotherapy    Return to clinic: 3 weeks    Length of Service (minutes): 30        Each patient to whom he or she provides medical services by telemedicine is:  (1) informed of the relationship between the physician and patient and the respective role of any other health care provider with respect to management of the patient; and (2) notified that he or she may decline to receive medical services by telemedicine and may withdraw from such care at any time.

## 2022-02-04 ENCOUNTER — TELEPHONE (OUTPATIENT)
Dept: BEHAVIORAL HEALTH | Facility: CLINIC | Age: 42
End: 2022-02-04
Payer: COMMERCIAL

## 2022-02-04 NOTE — TELEPHONE ENCOUNTER
LORRIE staffed case with Dr. Estrella about pt's concerns she read online about Trazadone and Buspirone.  Dr. Estrella stated pt could take meds as prescribed.  LORRIE relayed this message to pt.  Pt verbalized understanding.

## 2022-02-21 ENCOUNTER — TELEPHONE (OUTPATIENT)
Dept: BEHAVIORAL HEALTH | Facility: CLINIC | Age: 42
End: 2022-02-21
Payer: COMMERCIAL

## 2022-02-21 ENCOUNTER — PATIENT MESSAGE (OUTPATIENT)
Dept: BEHAVIORAL HEALTH | Facility: CLINIC | Age: 42
End: 2022-02-21
Payer: COMMERCIAL

## 2022-02-22 ENCOUNTER — TELEPHONE (OUTPATIENT)
Dept: BEHAVIORAL HEALTH | Facility: CLINIC | Age: 42
End: 2022-02-22
Payer: COMMERCIAL

## 2022-02-23 ENCOUNTER — OFFICE VISIT (OUTPATIENT)
Dept: BEHAVIORAL HEALTH | Facility: CLINIC | Age: 42
End: 2022-02-23
Payer: COMMERCIAL

## 2022-02-23 DIAGNOSIS — F41.1 GENERALIZED ANXIETY DISORDER: Primary | ICD-10-CM

## 2022-02-23 PROCEDURE — 90832 PR PSYCHOTHERAPY W/PATIENT, 30 MIN: ICD-10-PCS | Mod: 95,,, | Performed by: SOCIAL WORKER

## 2022-02-23 PROCEDURE — 90832 PSYTX W PT 30 MINUTES: CPT | Mod: 95,,, | Performed by: SOCIAL WORKER

## 2022-02-23 NOTE — Clinical Note
Dr. Mccartney,  Please see my addendum from today's [facility] meeting.  If you agree with this plan, I will pend order for Buspar 10mg tablets in a separate refill encounter.  Please reach out to me with any questions.  Jo Estrella

## 2022-02-23 NOTE — PROGRESS NOTES
The patient location is: [place]  The chief complaint leading to consultation is: anxiety    Visit type: audiovisual    Face to Face time with patient: 30  35 minutes of total time spent on the encounter, which includes face to face time and non-face to face time preparing to see the patient (eg, review of tests), Obtaining and/or reviewing separately obtained history, Documenting clinical information in the electronic or other health record, Independently interpreting results (not separately reported) and communicating results to the patient/family/caregiver, or Care coordination (not separately reported).     Individual Psychotherapy (LCSW/PhD)  Tobi Dennies,  2/23/2022    Site:  Telemed         Therapeutic Intervention: Met with patient for individual psychotherapy.    Chief complaint/reason for encounter: anxiety     Interval history and content of current session: Pt notes that she is no longer having panic attacks but is still having daily anxiety.  She shares that she t takes buspirone once in morning and when she gets home.  She notes she has difficulty sleeping when her daughter is sleeping somewhere else.  We discussed getting a white noise machine.  She is still exercising.  Pt continues to have difficulty focusing.  She has not called to make an appointment with psychiatry to get evaluated for ADHD but is still interested in that.  PT's LYLY score has remained about the same.  SW will staff with Dr. Estrella to discuss if any changes need to be made with medication.    Treatment plan:  · Target symptoms: anxiety   · Why chosen therapy is appropriate versus another modality: relevant to diagnosis, patient responds to this modality, evidence based practice  · Outcome monitoring methods: self-report, observation, psychological tests  · Therapeutic intervention type:   ·     Risk parameters:  Patient reports no suicidal ideation  Patient reports no homicidal ideation  Patient reports no self-injurious  behavior  Patient reports no violent behavior    Verbal deficits: None    Patient's response to intervention:  The patient's response to intervention is motivated.    Progress toward goals and other mental status changes:  The patient's progress toward goals is fair .    Diagnosis:   No diagnosis found.    Plan: Pt plans to continue individual psychotherapy    Return to clinic: 2 weeks    Length of Service (minutes): 30        Each patient to whom he or she provides medical services by telemedicine is:  (1) informed of the relationship between the physician and patient and the respective role of any other health care provider with respect to management of the patient; and (2) notified that he or she may decline to receive medical services by telemedicine and may withdraw from such care at any time.

## 2022-02-25 DIAGNOSIS — F41.1 GENERALIZED ANXIETY DISORDER: ICD-10-CM

## 2022-02-25 RX ORDER — BUSPIRONE HYDROCHLORIDE 10 MG/1
10 TABLET ORAL 3 TIMES DAILY
Qty: 90 TABLET | Refills: 2 | Status: SHIPPED | OUTPATIENT
Start: 2022-02-25 | End: 2022-09-01 | Stop reason: SDUPTHER

## 2022-02-25 NOTE — PROGRESS NOTES
Patient discussed during [facility] meeting today.  Patient with some improvement in anxiety symptoms on Buspar 5mg BID (mostly just reduction in panic attacks and not her baseline anxiety).  Would recommend increasing dose to 10mg and consider taking TID if she finds this more helpful.  Will pend order for PCP.      Time: 10 minutes

## 2022-03-09 ENCOUNTER — OFFICE VISIT (OUTPATIENT)
Dept: BEHAVIORAL HEALTH | Facility: CLINIC | Age: 42
End: 2022-03-09
Payer: COMMERCIAL

## 2022-03-09 DIAGNOSIS — F41.1 GENERALIZED ANXIETY DISORDER: Primary | ICD-10-CM

## 2022-03-09 PROCEDURE — 90832 PR PSYCHOTHERAPY W/PATIENT, 30 MIN: ICD-10-PCS | Mod: 95,,, | Performed by: SOCIAL WORKER

## 2022-03-09 PROCEDURE — 90832 PSYTX W PT 30 MINUTES: CPT | Mod: 95,,, | Performed by: SOCIAL WORKER

## 2022-03-09 NOTE — PROGRESS NOTES
The patient location is: [place]  The chief complaint leading to consultation is: anxiety    Visit type: audiovisual    Face to Face time with patient: 30  35 minutes of total time spent on the encounter, which includes face to face time and non-face to face time preparing to see the patient (eg, review of tests), Obtaining and/or reviewing separately obtained history, Documenting clinical information in the electronic or other health record, Independently interpreting results (not separately reported) and communicating results to the patient/family/caregiver, or Care coordination (not separately reported).     Individual Psychotherapy (LCSW/PhD)  Tobi Dennies,  3/9/2022    Site:  Telemed         Therapeutic Intervention: Met with patient for individual psychotherapy.    Chief complaint/reason for encounter: anxiety     Interval history and content of current session: Pt shares she had a panic attack when she got lost and was driving.  She took her buspirone and found this helped calm her down.  SW informed her that Dr. Estrella had increased her buspirone to 10 mg and that pt could take it TID if needed.  We discussed how the 5 mg was helping with panic but pt was still having daily anxiety.  We discussed how this higher dose may help with daily anxiety.  Pt has been continuing to exercise.  She still has trouble with sleep on nights her daughter is away from the home or lately because her daughter is sick.  SW continued to encourage good sleep hygiene.    Treatment plan:  · Target symptoms: anxiety   · Why chosen therapy is appropriate versus another modality: relevant to diagnosis, patient responds to this modality, evidence based practice  · Outcome monitoring methods: self-report, observation  · Therapeutic intervention type: insight oriented psychotherapy, behavior modifying psychotherapy, supportive psychotherapy    Risk parameters:  Patient reports no suicidal ideation  Patient reports no homicidal  ideation  Patient reports no self-injurious behavior  Patient reports no violent behavior    Verbal deficits: None    Patient's response to intervention:  The patient's response to intervention is accepting.    Progress toward goals and other mental status changes:  The patient's progress toward goals is fair .    Diagnosis:     ICD-10-CM ICD-9-CM   1. Generalized anxiety disorder  F41.1 300.02       Plan: Pt plans to continue individual psychotherapy    Return to clinic: 6 weeks    Length of Service (minutes): 30        Each patient to whom he or she provides medical services by telemedicine is:  (1) informed of the relationship between the physician and patient and the respective role of any other health care provider with respect to management of the patient; and (2) notified that he or she may decline to receive medical services by telemedicine and may withdraw from such care at any time.

## 2022-04-18 ENCOUNTER — TELEPHONE (OUTPATIENT)
Dept: BEHAVIORAL HEALTH | Facility: CLINIC | Age: 42
End: 2022-04-18
Payer: COMMERCIAL

## 2022-04-18 ENCOUNTER — PATIENT MESSAGE (OUTPATIENT)
Dept: BEHAVIORAL HEALTH | Facility: CLINIC | Age: 42
End: 2022-04-18
Payer: COMMERCIAL

## 2022-04-19 ENCOUNTER — TELEPHONE (OUTPATIENT)
Dept: BEHAVIORAL HEALTH | Facility: CLINIC | Age: 42
End: 2022-04-19
Payer: COMMERCIAL

## 2022-04-19 NOTE — PROGRESS NOTES
Behavioral Health Community Health Worker  Follow-Up  Completed by:  Lavelle Madrigal    Date:  4/19/2022    Patient Enrollment in Behavioral Health Program:  · Tobi Dennies was enrolled in the Behavioral Health Program on 2/2/22    Assessments     Promis 10:  No flowsheet data found.    Depression PHQ:  PHQ9 2/2/2022   Total Score 5       Generalized Anxiety Disorder 7-Item Scale:  GAD7 4/18/2022   1. Feeling nervous, anxious, or on edge? 2   2. Not being able to stop or control worrying? 1   3. Worrying too much about different things? 1   4. Trouble relaxing? 2   5. Being so restless that it is hard to sit still? 2   6. Becoming easily annoyed or irritable? 1   7. Feeling afraid as if something awful might happen? 0   8. If you checked off any problems, how difficult have these problems made it for you to do your work, take care of things at home, or get along with other people? -   LYLY-7 Score 9       Patients' Global Impression of Change (PGIC) Scale:  Since beginning treatment at this clinic, how would you describe the change (if any) in ACTIVITY LIMITATIONS, SYMPTOMS, EMOTIONS, and OVERALL QUALITY OF LIFE, related to your painful condition?  No Value exists for the : [facility]#29175      In a similar way, please check the number below that matches your degree of change since beginning care at this clinic (Much better (0) - Much Worse (10)): No Value exists for the : [facility]#54111        Much Better                                     No Change                                    Much Worse                        -----------------------------------------------------------------------------                        0       1       2       3       4       5       6       7      8       9      10                     Call Summary     Patient updated assessments  And says she is not SI/HI and or having self harm thoughts at this time.  Questionnaire: Personal Health Questionnaire: Depression Scale (PHQ8)     Question: 1.  Little interest or pleasure in doing things  Answer:   Several days     Question: 2. Feeling down, depressed, or hopeless  Answer:   Not at all     Question: 3. Trouble falling or staying asleep, or sleeping too much  Answer:   More than half the days     Question: 4. Feeling tired or having little energy  Answer:   More than half the days     Question: 5. poor appetite or overeating  Answer:   Several days     Question: 6. Feeling bad about yourself - or that you are a failure or have let yourself or your family down  Answer:   Not at all     Question: 7. Trouble concentrating on things, such as reading the newspaper or watching television  Answer:   More than half the days     Question: 8. Moving or speaking so slowly that other people could have noticed? Or the opposite - being so fidgety or restless that you have been moving around a lot more than usual  Answer:   Not at all

## 2022-04-26 ENCOUNTER — TELEPHONE (OUTPATIENT)
Dept: BEHAVIORAL HEALTH | Facility: CLINIC | Age: 42
End: 2022-04-26
Payer: COMMERCIAL

## 2022-06-22 ENCOUNTER — TELEPHONE (OUTPATIENT)
Dept: BEHAVIORAL HEALTH | Facility: CLINIC | Age: 42
End: 2022-06-22
Payer: COMMERCIAL

## 2022-06-22 ENCOUNTER — PATIENT MESSAGE (OUTPATIENT)
Dept: BEHAVIORAL HEALTH | Facility: CLINIC | Age: 42
End: 2022-06-22
Payer: COMMERCIAL

## 2022-07-12 ENCOUNTER — PATIENT MESSAGE (OUTPATIENT)
Dept: BEHAVIORAL HEALTH | Facility: CLINIC | Age: 42
End: 2022-07-12
Payer: COMMERCIAL

## 2022-07-12 ENCOUNTER — TELEPHONE (OUTPATIENT)
Dept: BEHAVIORAL HEALTH | Facility: CLINIC | Age: 42
End: 2022-07-12
Payer: COMMERCIAL

## 2022-07-14 ENCOUNTER — OFFICE VISIT (OUTPATIENT)
Dept: BEHAVIORAL HEALTH | Facility: CLINIC | Age: 42
End: 2022-07-14
Payer: COMMERCIAL

## 2022-07-14 ENCOUNTER — PATIENT MESSAGE (OUTPATIENT)
Dept: BEHAVIORAL HEALTH | Facility: CLINIC | Age: 42
End: 2022-07-14

## 2022-07-14 DIAGNOSIS — F41.1 GENERALIZED ANXIETY DISORDER: Primary | ICD-10-CM

## 2022-07-14 PROCEDURE — 90832 PSYTX W PT 30 MINUTES: CPT | Mod: 95,,, | Performed by: SOCIAL WORKER

## 2022-07-14 PROCEDURE — 90832 PR PSYCHOTHERAPY W/PATIENT, 30 MIN: ICD-10-PCS | Mod: 95,,, | Performed by: SOCIAL WORKER
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
## 2022-08-23 ENCOUNTER — TELEPHONE (OUTPATIENT)
Dept: BEHAVIORAL HEALTH | Facility: CLINIC | Age: 42
End: 2022-08-23
Payer: COMMERCIAL

## 2022-08-23 ENCOUNTER — PATIENT MESSAGE (OUTPATIENT)
Dept: BEHAVIORAL HEALTH | Facility: CLINIC | Age: 42
End: 2022-08-23
Payer: COMMERCIAL

## 2022-08-23 NOTE — PROGRESS NOTES
Behavioral Health Community Health Worker  Follow-Up  Completed by:  Lavelle Madrigal    Date:  8/23/2022    Patient Enrollment in Behavioral Health Program:  · Tobi Dennies was enrolled in the Behavioral Health Program on 2/25/22    Assessments     Promis 10:  No flowsheet data found.    Depression PHQ:  PHQ9 8/23/2022   Total Score 8       Generalized Anxiety Disorder 7-Item Scale:  GAD7 8/23/2022   1. Feeling nervous, anxious, or on edge? 2   2. Not being able to stop or control worrying? 2   3. Worrying too much about different things? 2   4. Trouble relaxing? 2   5. Being so restless that it is hard to sit still? 2   6. Becoming easily annoyed or irritable? 1   7. Feeling afraid as if something awful might happen? 1   8. If you checked off any problems, how difficult have these problems made it for you to do your work, take care of things at home, or get along with other people? -   LYLY-7 Score 12       Patients' Global Impression of Change (PGIC) Scale:  Since beginning treatment at this clinic, how would you describe the change (if any) in ACTIVITY LIMITATIONS, SYMPTOMS, EMOTIONS, and OVERALL QUALITY OF LIFE, related to your painful condition?  No Value exists for the : [facility]#72575      In a similar way, please check the number below that matches your degree of change since beginning care at this clinic (Much better (0) - Much Worse (10)): No Value exists for the : [facility]#62730        Much Better                                     No Change                                    Much Worse                        -----------------------------------------------------------------------------                        0       1       2       3       4       5       6       7      8       9      10                     Call Summary   Assessment updated and termination time.

## 2022-08-25 ENCOUNTER — OFFICE VISIT (OUTPATIENT)
Dept: BEHAVIORAL HEALTH | Facility: CLINIC | Age: 42
End: 2022-08-25
Payer: COMMERCIAL

## 2022-08-25 ENCOUNTER — PATIENT MESSAGE (OUTPATIENT)
Dept: BEHAVIORAL HEALTH | Facility: CLINIC | Age: 42
End: 2022-08-25

## 2022-08-25 DIAGNOSIS — F41.0 PANIC DISORDER: ICD-10-CM

## 2022-08-25 DIAGNOSIS — F41.1 GENERALIZED ANXIETY DISORDER: Primary | ICD-10-CM

## 2022-08-25 PROCEDURE — 90832 PR PSYCHOTHERAPY W/PATIENT, 30 MIN: ICD-10-PCS | Mod: 95,,, | Performed by: SOCIAL WORKER

## 2022-08-25 PROCEDURE — 90832 PSYTX W PT 30 MINUTES: CPT | Mod: 95,,, | Performed by: SOCIAL WORKER
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
## 2022-09-01 ENCOUNTER — OFFICE VISIT (OUTPATIENT)
Dept: PSYCHIATRY | Facility: CLINIC | Age: 42
End: 2022-09-01
Payer: COMMERCIAL

## 2022-09-01 VITALS
HEART RATE: 73 BPM | DIASTOLIC BLOOD PRESSURE: 63 MMHG | BODY MASS INDEX: 20.41 KG/M2 | SYSTOLIC BLOOD PRESSURE: 102 MMHG | WEIGHT: 108 LBS

## 2022-09-01 DIAGNOSIS — F41.0 PANIC DISORDER: ICD-10-CM

## 2022-09-01 DIAGNOSIS — Z13.39 ADHD (ATTENTION DEFICIT HYPERACTIVITY DISORDER) EVALUATION: ICD-10-CM

## 2022-09-01 DIAGNOSIS — F41.1 GENERALIZED ANXIETY DISORDER: ICD-10-CM

## 2022-09-01 PROCEDURE — 1159F PR MEDICATION LIST DOCUMENTED IN MEDICAL RECORD: ICD-10-PCS | Mod: CPTII,S$GLB,, | Performed by: NURSE PRACTITIONER

## 2022-09-01 PROCEDURE — 90792 PSYCH DIAG EVAL W/MED SRVCS: CPT | Mod: S$GLB,,, | Performed by: NURSE PRACTITIONER

## 2022-09-01 PROCEDURE — 3008F BODY MASS INDEX DOCD: CPT | Mod: CPTII,S$GLB,, | Performed by: NURSE PRACTITIONER

## 2022-09-01 PROCEDURE — 3074F SYST BP LT 130 MM HG: CPT | Mod: CPTII,S$GLB,, | Performed by: NURSE PRACTITIONER

## 2022-09-01 PROCEDURE — 1159F MED LIST DOCD IN RCRD: CPT | Mod: CPTII,S$GLB,, | Performed by: NURSE PRACTITIONER

## 2022-09-01 PROCEDURE — 99999 PR PBB SHADOW E&M-EST. PATIENT-LVL III: ICD-10-PCS | Mod: PBBFAC,,, | Performed by: NURSE PRACTITIONER

## 2022-09-01 PROCEDURE — 99999 PR PBB SHADOW E&M-EST. PATIENT-LVL III: CPT | Mod: PBBFAC,,, | Performed by: NURSE PRACTITIONER

## 2022-09-01 PROCEDURE — 90792 PR PSYCHIATRIC DIAGNOSTIC EVALUATION W/MEDICAL SERVICES: ICD-10-PCS | Mod: S$GLB,,, | Performed by: NURSE PRACTITIONER

## 2022-09-01 PROCEDURE — 3008F PR BODY MASS INDEX (BMI) DOCUMENTED: ICD-10-PCS | Mod: CPTII,S$GLB,, | Performed by: NURSE PRACTITIONER

## 2022-09-01 PROCEDURE — 3078F PR MOST RECENT DIASTOLIC BLOOD PRESSURE < 80 MM HG: ICD-10-PCS | Mod: CPTII,S$GLB,, | Performed by: NURSE PRACTITIONER

## 2022-09-01 PROCEDURE — 1160F PR REVIEW ALL MEDS BY PRESCRIBER/CLIN PHARMACIST DOCUMENTED: ICD-10-PCS | Mod: CPTII,S$GLB,, | Performed by: NURSE PRACTITIONER

## 2022-09-01 PROCEDURE — 3074F PR MOST RECENT SYSTOLIC BLOOD PRESSURE < 130 MM HG: ICD-10-PCS | Mod: CPTII,S$GLB,, | Performed by: NURSE PRACTITIONER

## 2022-09-01 PROCEDURE — 3078F DIAST BP <80 MM HG: CPT | Mod: CPTII,S$GLB,, | Performed by: NURSE PRACTITIONER

## 2022-09-01 PROCEDURE — 1160F RVW MEDS BY RX/DR IN RCRD: CPT | Mod: CPTII,S$GLB,, | Performed by: NURSE PRACTITIONER

## 2022-09-01 RX ORDER — BUSPIRONE HYDROCHLORIDE 15 MG/1
15 TABLET ORAL 3 TIMES DAILY
Qty: 90 TABLET | Refills: 2 | Status: SHIPPED | OUTPATIENT
Start: 2022-09-01 | End: 2022-10-12 | Stop reason: SDUPTHER
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
## 2022-09-20 ENCOUNTER — PATIENT MESSAGE (OUTPATIENT)
Dept: BEHAVIORAL HEALTH | Facility: CLINIC | Age: 42
End: 2022-09-20
Payer: COMMERCIAL

## 2022-09-22 ENCOUNTER — TELEPHONE (OUTPATIENT)
Dept: BEHAVIORAL HEALTH | Facility: CLINIC | Age: 42
End: 2022-09-22
Payer: COMMERCIAL

## 2022-09-22 NOTE — PROGRESS NOTES
Behavioral Health Community Health Worker  Follow-Up  Completed by:  Lavelle Madrigal    Date:  9/22/2022    Patient Enrollment in Behavioral Health Program:  Tobi Dennies was enrolled in the Behavioral Health Program on 2/25/22    Assessments     Promis 10:  No flowsheet data found.    Depression PHQ:  PHQ9 9/22/2022   Total Score 11   Some encounter information is confidential and restricted. Go to Review Flowsheets activity to see all data.       Generalized Anxiety Disorder 7-Item Scale:  GAD7 9/22/2022   1. Feeling nervous, anxious, or on edge? 1   2. Not being able to stop or control worrying? 3   3. Worrying too much about different things? 3   4. Trouble relaxing? 3   5. Being so restless that it is hard to sit still? 3   6. Becoming easily annoyed or irritable? 1   7. Feeling afraid as if something awful might happen? 1   8. If you checked off any problems, how difficult have these problems made it for you to do your work, take care of things at home, or get along with other people? 1   LYLY-7 Score 15   Some encounter information is confidential and restricted. Go to Review Flowsheets activity to see all data.       Patients' Global Impression of Change (PGIC) Scale:  Since beginning treatment at this clinic, how would you describe the change (if any) in ACTIVITY LIMITATIONS, SYMPTOMS, EMOTIONS, and OVERALL QUALITY OF LIFE, related to your painful condition?  No Value exists for the : [facility]#[id number]      In a similar way, please check the number below that matches your degree of change since beginning care at this clinic (Much better (0) - Much Worse (10)): No Value exists for the : [facility]#[id number]        Much Better                                     No Change                                    Much Worse                        -----------------------------------------------------------------------------                        0       1       2       3       4       5       6       7      8       9      10                      Call Summary     Assessment updated

## 2022-10-03 ENCOUNTER — PATIENT MESSAGE (OUTPATIENT)
Dept: BEHAVIORAL HEALTH | Facility: CLINIC | Age: 42
End: 2022-10-03
Payer: COMMERCIAL

## 2022-10-12 ENCOUNTER — OFFICE VISIT (OUTPATIENT)
Dept: PSYCHIATRY | Facility: CLINIC | Age: 42
End: 2022-10-12
Payer: COMMERCIAL

## 2022-10-12 DIAGNOSIS — F41.1 GENERALIZED ANXIETY DISORDER: ICD-10-CM

## 2022-10-12 DIAGNOSIS — F41.0 PANIC DISORDER: ICD-10-CM

## 2022-10-12 DIAGNOSIS — F90.2 ADHD (ATTENTION DEFICIT HYPERACTIVITY DISORDER), COMBINED TYPE: Primary | ICD-10-CM

## 2022-10-12 PROCEDURE — 99214 OFFICE O/P EST MOD 30 MIN: CPT | Mod: 95,S$GLB,, | Performed by: NURSE PRACTITIONER

## 2022-10-12 PROCEDURE — 99999 PR PBB SHADOW E&M-EST. PATIENT-LVL I: CPT | Mod: PBBFAC,,, | Performed by: NURSE PRACTITIONER

## 2022-10-12 PROCEDURE — 99999 PR PBB SHADOW E&M-EST. PATIENT-LVL I: ICD-10-PCS | Mod: PBBFAC,,, | Performed by: NURSE PRACTITIONER

## 2022-10-12 PROCEDURE — 99214 PR OFFICE/OUTPT VISIT, EST, LEVL IV, 30-39 MIN: ICD-10-PCS | Mod: 95,S$GLB,, | Performed by: NURSE PRACTITIONER

## 2022-10-12 RX ORDER — LISDEXAMFETAMINE DIMESYLATE 30 MG/1
30 CAPSULE ORAL EVERY MORNING
Qty: 30 CAPSULE | Refills: 0 | Status: SHIPPED | OUTPATIENT
Start: 2022-10-12 | End: 2022-10-12 | Stop reason: SDUPTHER

## 2022-10-12 RX ORDER — LISDEXAMFETAMINE DIMESYLATE 30 MG/1
30 CAPSULE ORAL EVERY MORNING
Qty: 30 CAPSULE | Refills: 0 | Status: SHIPPED | OUTPATIENT
Start: 2022-10-12 | End: 2022-11-25 | Stop reason: SDUPTHER

## 2022-10-12 RX ORDER — BUSPIRONE HYDROCHLORIDE 15 MG/1
15 TABLET ORAL 3 TIMES DAILY
Qty: 90 TABLET | Refills: 2 | Status: SHIPPED | OUTPATIENT
Start: 2022-10-12 | End: 2022-12-30 | Stop reason: SDUPTHER

## 2022-10-12 NOTE — PROGRESS NOTES
Outpatient Psychiatry Follow-Up Visit (MD/NP)    10/12/2022    The patient location is: [place]   The chief complaint leading to consultation is: anxiety, ADHD    Visit type: audiovisual    Face to Face time with patient: 24 minutes  25 minutes of total time spent on the encounter, which includes face to face time and non-face to face time preparing to see the patient (eg, review of tests), Obtaining and/or reviewing separately obtained history, Documenting clinical information in the electronic or other health record, Independently interpreting results (not separately reported) and communicating results to the patient/family/caregiver, or Care coordination (not separately reported).     Each patient to whom he or she provides medical services by telemedicine is:  (1) informed of the relationship between the physician and patient and the respective role of any other health care provider with respect to management of the patient; and (2) notified that he or she may decline to receive medical services by telemedicine and may withdraw from such care at any time.    Clinical Status of Patient:  Outpatient (Ambulatory)    Chief Complaint:  Tobi Dennies is a 42 y.o. female who presents today for follow-up of anxiety and attention problems.  Met with patient.      Interval History and Content of Current Session:  Interim Events/Subjective Report/Content of Current Session:   Tobi Dennies checked in on time for her appointment. At initial visit we increased Buspar. Today she reports improvement overall, nicola to identify actual triggers and use coping skills to work through the anxiety. A million thoughts in head, can't focus on one, easily distracted, interrupting people, feels its so rude, disengaging in conversation, starting multiple tasks at once and not finishing them.  At work they did find a replacement, but they are new so she is training them. People tell her she is all over the place, moving fast, feels she has  "to move "50 miles a minute to get things done." Discussed risk and benefits to starting a stimulant- including heart palpitations. Patient conveyed understanding. Denies SI/HI/AVH. No objective s/sx of psychosis or shad. Patient verbalized motivation for compliance with medications and all other elements of treatment plan.      Family history- adopted  Daughter - ADHD    Review of Systems   PSYCHIATRIC: Pertinant items are noted in the narrative.  CONSTITUTIONAL: No weight gain or loss.   MUSCULOSKELETAL: No pain or stiffness of the joints.  RESPIRATORY: No shortness of breath.  CARDIOVASCULAR: No tachycardia or chest pain.  GASTROINTESTINAL: No nausea, vomiting, pain, constipation or diarrhea.    Past Medical, Family and Social History: The patient's past medical, family and social history have been reviewed and updated as appropriate within the electronic medical record - see encounter notes.    Compliance: yes    Side effects: see above    Risk Parameters:  Patient reports no suicidal ideation  Patient reports no homicidal ideation  Patient reports no self-injurious behavior  Patient reports no violent behavior    Exam (detailed: at least 9 elements; comprehensive: all 15 elements)   Constitutional  Vitals:  Most recent vital signs, dated less than 90 days prior to this appointment, were reviewed.   There were no vitals filed for this visit.     General:  unremarkable, age appropriate     Musculoskeletal  Muscle Strength/Tone:  not examined   Gait & Station:  QUENTIN due to virtual visit     Psychiatric  Speech:  no latency; no press   Mood & Affect:  steady  congruent and appropriate   Thought Process:  normal and logical   Associations:  intact   Thought Content:  normal, no suicidality, no homicidality, delusions, or paranoia   Insight:  intact   Judgement: behavior is adequate to circumstances   Orientation:  grossly intact   Memory: intact for content of interview   Language: grossly intact   Attention Span & " Concentration:  able to focus   Fund of Knowledge:  intact and appropriate to age and level of education     Assessment and Diagnosis   Status/Progress: Based on the examination today, the patient's problem(s) is/are improved and adequately but not ideally controlled.  New problems have not been presented today.   Co-morbidities and Diagnostic uncertainty are complicating management of the primary condition.  The working differential for this patient includes LYLY vs ADHD.     General Impression: Tobi Dennies, a 42 y.o.  female, presenting for follow-up visit for anxiety and ADHD - will try to better manage anxiety and then reassess ADHD symptoms. Presents 10/12/22- anxiety improved, attention remains poor, Vyvanse started      ICD-10-CM ICD-9-CM   1. ADHD (attention deficit hyperactivity disorder), combined type  F90.2 314.01   2. Generalized anxiety disorder  F41.1 300.02   3. Panic disorder  F41.0 300.01       Intervention/Counseling/Treatment Plan   Medication Management: Continue current medications.   Start Vyvanse 30 mg daily  Patient has no contraindications: no h/o allergic rxn, agitation, anxiety, tourette's, arrythmia, cardiovascular disease, cardiac structural abnormalities, hyperthyroidism, glaucoma, Other psychiatric illness, etc.   Discussed diagnosis, risks and benefits of proposed treatment vs alternative treatments vs no treatment, and potential side effects of these treatments (death, dependency, psychosis, shad, aggression, HTN, ticks, MI, stroke, arrythmia, seizure, anaphylaxis or other allergic reactions, leukopenia, nervousness, anorexia, insomnia, tachycardia, palpitations, dizziness, BP changes, HR changes, visual disturbance, etc.). The patient expresses understanding of the above and displays the capacity to agree with this treatment given said understanding. Patient also agrees that, currently, the benefits outweigh the risks and would like to pursue treatment at this time.  Continue  Buspar 15 mg BID  The risks and benefits of medication were discussed with the patient.  Discussed diagnosis, risks and benefits of proposed treatment above vs alternative treatments vs no treatment, and potential side effects of these treatments. The patient expresses understanding of the above and displays the capacity to agree with this treatment given said understanding. Patient also agrees that, currently, the benefits outweigh the risks and would like to pursue treatment at this time.  Discussed inherent unpredictability of medications in each individual.   Encouraged Patient to keep future appointments.   Take medications as prescribed and abstain from substance abuse.   In the event of an emergency patient was advised to go to the emergency room      Return to Clinic: 3 months    Rosa Maria Skinner DNP-BC PMHNP  [facility]

## 2022-10-18 ENCOUNTER — PATIENT MESSAGE (OUTPATIENT)
Dept: BEHAVIORAL HEALTH | Facility: CLINIC | Age: 42
End: 2022-10-18
Payer: COMMERCIAL

## 2022-10-18 ENCOUNTER — TELEPHONE (OUTPATIENT)
Dept: BEHAVIORAL HEALTH | Facility: CLINIC | Age: 42
End: 2022-10-18
Payer: COMMERCIAL

## 2022-10-18 NOTE — PROGRESS NOTES
Behavioral Health Community Health Worker  Follow-Up  Completed by:  Lavelle Madriagl    Date:  10/18/2022    Patient Enrollment in Behavioral Health Program:  Tobi Dennies was enrolled in the Behavioral Health Program on 2/25/22    Assessments     Promis 10:  No flowsheet data found.    Depression PHQ:  PHQ9 10/18/2022   Total Score 10   Some encounter information is confidential and restricted. Go to Review Flowsheets activity to see all data.       Generalized Anxiety Disorder 7-Item Scale:  GAD7 10/18/2022   1. Feeling nervous, anxious, or on edge? 1   2. Not being able to stop or control worrying? 1   3. Worrying too much about different things? 1   4. Trouble relaxing? 1   5. Being so restless that it is hard to sit still? 1   6. Becoming easily annoyed or irritable? 0   7. Feeling afraid as if something awful might happen? 0   8. If you checked off any problems, how difficult have these problems made it for you to do your work, take care of things at home, or get along with other people? -   LYLY-7 Score 5   Some encounter information is confidential and restricted. Go to Review Flowsheets activity to see all data.       Patients' Global Impression of Change (PGIC) Scale:  Since beginning treatment at this clinic, how would you describe the change (if any) in ACTIVITY LIMITATIONS, SYMPTOMS, EMOTIONS, and OVERALL QUALITY OF LIFE, related to your painful condition?  No Value exists for the : [facility]#[id number]      In a similar way, please check the number below that matches your degree of change since beginning care at this clinic (Much better (0) - Much Worse (10)): No Value exists for the : [facility]#01614        Much Better                                     No Change                                    Much Worse                        -----------------------------------------------------------------------------                        0       1       2       3       4       5       6       7      8       9      10                      Call Summary     Assessment updated

## 2022-10-27 ENCOUNTER — OFFICE VISIT (OUTPATIENT)
Dept: BEHAVIORAL HEALTH | Facility: CLINIC | Age: 42
End: 2022-10-27
Payer: COMMERCIAL

## 2022-10-27 DIAGNOSIS — F90.2 ADHD (ATTENTION DEFICIT HYPERACTIVITY DISORDER), COMBINED TYPE: ICD-10-CM

## 2022-10-27 DIAGNOSIS — F41.1 GENERALIZED ANXIETY DISORDER: Primary | ICD-10-CM

## 2022-10-27 PROCEDURE — 90834 PSYTX W PT 45 MINUTES: CPT | Mod: 95,,, | Performed by: SOCIAL WORKER

## 2022-10-27 PROCEDURE — 90834 PR PSYCHOTHERAPY W/PATIENT, 45 MIN: ICD-10-PCS | Mod: 95,,, | Performed by: SOCIAL WORKER

## 2022-10-27 NOTE — PROGRESS NOTES
The patient location is: [place]  The chief complaint leading to consultation is: anxiety    Visit type: audiovisual    Face to Face time with patient: 45  47 minutes of total time spent on the encounter, which includes face to face time and non-face to face time preparing to see the patient (eg, review of tests), Obtaining and/or reviewing separately obtained history, Documenting clinical information in the electronic or other health record, Independently interpreting results (not separately reported) and communicating results to the patient/family/caregiver, or Care coordination (not separately reported).     Individual Psychotherapy (LCSW/PhD)  Tobi Dennies,  10/27/2022    Site:  Telemed         Therapeutic Intervention: Met with patient for individual psychotherapy.    Chief complaint/reason for encounter: attention deficit and anxiety     Interval history and content of current session: She took off work for a few days. PT shares she is feeling better. Pt saw Rosa Maria Skinner who put her on vyvanse which is helping her focus more.  She isn't as worried about things. She is able to focus on one task at a time.  She still finds the buspar is helping. She does notice sometimes she feels her heart beating but her watch doesn't show her pulse is elevated. She is drinking less coffee. She treated herself by getting her nails done.  She is still taking the buspar but doesn't take it 3 times a day. Discussed ways to time it to take it 3 times. She is having somewhat of a hard time falling asleep with the vyvanse. She is better able to get things done at work.   She feels she will always worry sometimes about her daughter. She is looking for a house to buy with her boyfriend.  She is better able to focus when she is having a conversation with someone.  She shares she doesn't have much an appetite with the vyvanse but still eats regular meals. Discussed terminating which pt is in agreement with.  Pt to discuss with Rosa Maria  Brody if she would like long term therapy.  She will reach out to pcp if she would like to return [facility].     Treatment plan:  Target symptoms: lack of focus, anxiety   Why chosen therapy is appropriate versus another modality: relevant to diagnosis, patient responds to this modality  Outcome monitoring methods: self-report, observation  Therapeutic intervention type: behavior modifying psychotherapy, supportive psychotherapy    Risk parameters:  Patient reports no suicidal ideation  Patient reports no homicidal ideation  Patient reports no self-injurious behavior  Patient reports no violent behavior    Verbal deficits: None    Patient's response to intervention:  The patient's response to intervention is accepting.    Progress toward goals and other mental status changes:  The patient's progress toward goals is good.    Diagnosis:     ICD-10-CM ICD-9-CM   1. Generalized anxiety disorder  F41.1 300.02   2. ADHD (attention deficit hyperactivity disorder), combined type  F90.2 314.01       Plan: Pt plans to terminate    Return to clinic:  Pt to reach out to pcp or Rosa Maria Skinner if she would like to return to therapy.    Length of Service (minutes): 45        Each patient to whom he or she provides medical services by telemedicine is:  (1) informed of the relationship between the physician and patient and the respective role of any other health care provider with respect to management of the patient; and (2) notified that he or she may decline to receive medical services by telemedicine and may withdraw from such care at any time.

## 2022-10-28 ENCOUNTER — OFFICE VISIT (OUTPATIENT)
Dept: INTERNAL MEDICINE | Facility: CLINIC | Age: 42
End: 2022-10-28
Payer: COMMERCIAL

## 2022-10-28 VITALS
OXYGEN SATURATION: 99 % | WEIGHT: 105.81 LBS | HEART RATE: 99 BPM | DIASTOLIC BLOOD PRESSURE: 60 MMHG | SYSTOLIC BLOOD PRESSURE: 110 MMHG | BODY MASS INDEX: 19.99 KG/M2 | TEMPERATURE: 98 F

## 2022-10-28 DIAGNOSIS — J45.20 MILD INTERMITTENT ASTHMA WITHOUT COMPLICATION: ICD-10-CM

## 2022-10-28 DIAGNOSIS — N60.02 BENIGN CYST OF LEFT BREAST: ICD-10-CM

## 2022-10-28 DIAGNOSIS — F51.01 PRIMARY INSOMNIA: ICD-10-CM

## 2022-10-28 DIAGNOSIS — Z00.00 ANNUAL PHYSICAL EXAM: Primary | ICD-10-CM

## 2022-10-28 DIAGNOSIS — F90.2 ADHD (ATTENTION DEFICIT HYPERACTIVITY DISORDER), COMBINED TYPE: ICD-10-CM

## 2022-10-28 DIAGNOSIS — Z12.31 ENCOUNTER FOR SCREENING MAMMOGRAM FOR MALIGNANT NEOPLASM OF BREAST: ICD-10-CM

## 2022-10-28 DIAGNOSIS — F41.1 GENERALIZED ANXIETY DISORDER: ICD-10-CM

## 2022-10-28 PROBLEM — F41.0 PANIC DISORDER: Status: RESOLVED | Noted: 2022-09-01 | Resolved: 2022-10-28

## 2022-10-28 PROCEDURE — 99999 PR PBB SHADOW E&M-EST. PATIENT-LVL III: CPT | Mod: PBBFAC,,, | Performed by: INTERNAL MEDICINE

## 2022-10-28 PROCEDURE — 3074F SYST BP LT 130 MM HG: CPT | Mod: CPTII,S$GLB,, | Performed by: INTERNAL MEDICINE

## 2022-10-28 PROCEDURE — 3078F DIAST BP <80 MM HG: CPT | Mod: CPTII,S$GLB,, | Performed by: INTERNAL MEDICINE

## 2022-10-28 PROCEDURE — 99396 PR PREVENTIVE VISIT,EST,40-64: ICD-10-PCS | Mod: S$GLB,,, | Performed by: INTERNAL MEDICINE

## 2022-10-28 PROCEDURE — 99999 PR PBB SHADOW E&M-EST. PATIENT-LVL III: ICD-10-PCS | Mod: PBBFAC,,, | Performed by: INTERNAL MEDICINE

## 2022-10-28 PROCEDURE — 1159F MED LIST DOCD IN RCRD: CPT | Mod: CPTII,S$GLB,, | Performed by: INTERNAL MEDICINE

## 2022-10-28 PROCEDURE — 99396 PREV VISIT EST AGE 40-64: CPT | Mod: S$GLB,,, | Performed by: INTERNAL MEDICINE

## 2022-10-28 PROCEDURE — 3074F PR MOST RECENT SYSTOLIC BLOOD PRESSURE < 130 MM HG: ICD-10-PCS | Mod: CPTII,S$GLB,, | Performed by: INTERNAL MEDICINE

## 2022-10-28 PROCEDURE — 1159F PR MEDICATION LIST DOCUMENTED IN MEDICAL RECORD: ICD-10-PCS | Mod: CPTII,S$GLB,, | Performed by: INTERNAL MEDICINE

## 2022-10-28 PROCEDURE — 3078F PR MOST RECENT DIASTOLIC BLOOD PRESSURE < 80 MM HG: ICD-10-PCS | Mod: CPTII,S$GLB,, | Performed by: INTERNAL MEDICINE

## 2022-10-28 RX ORDER — ALBUTEROL SULFATE 90 UG/1
2 AEROSOL, METERED RESPIRATORY (INHALATION) EVERY 6 HOURS PRN
Qty: 18 G | Refills: 3 | Status: SHIPPED | OUTPATIENT
Start: 2022-10-28 | End: 2024-01-08 | Stop reason: SDUPTHER

## 2022-10-28 NOTE — ASSESSMENT & PLAN NOTE
Sees psych NP Rosa Maria Brody. Stable on Buspar with ativan PRN.  No SI/HI/panic attacks.  Not having as much anxiety since starting Vyvanse.

## 2022-10-28 NOTE — PROGRESS NOTES
[facility] Note    Chief Complaint      Chief Complaint   Patient presents with    Annual Exam     History of Present Illness      Tobi Dennies is a 42 y.o. female who presents today for Annual preventative visit.  Patient comes to appointment alone.  Psych: Rosa Maria Skinner, GYN: Katherine Bocanegra    Problem List Items Addressed This Visit       Mild intermittent asthma without complication    Current Assessment & Plan     Stable on albuterol PRN, no SOB/wheeze.  Has not needed inhaler lately.         Relevant Medications    albuterol (VENTOLIN HFA) 90 mcg/actuation inhaler    Generalized anxiety disorder    Current Assessment & Plan     Sees psych NP Rosa Maria Skinner. Stable on Buspar with ativan PRN.  No SI/HI/panic attacks.  Not having as much anxiety since starting Vyvanse.         Insomnia    Current Assessment & Plan     Stable on trazodone PRN (rarely takes), takes melatonin most nights.         ADHD (attention deficit hyperactivity disorder), combined type    Current Assessment & Plan     Stable on vyvanse, sees psych NP Rosa Maria Skinner.          Other Visit Diagnoses       Annual physical exam    -  Primary    Relevant Orders    CBC Auto Differential    Lipid Panel    Comprehensive Metabolic Panel    TSH    Hemoglobin A1C    Encounter for screening mammogram for malignant neoplasm of breast        Relevant Orders    Mammo Digital Screening Bilat w/ Marino    Benign cyst of left breast        Relevant Orders    US Breast Left Limited            Health Maintenance   Topic Date Due    TETANUS VACCINE  Never done    Mammogram  01/03/2023    Hepatitis C Screening  Completed    Lipid Panel  Completed       Past Medical History:   Diagnosis Date    Anxiety 2010    Asthma     age 4       Past Surgical History:   Procedure Laterality Date    cyst removal of scalp         family history includes ADD / ADHD (age of onset: 8) in her daughter. She was adopted.    Social History     Tobacco Use    Smoking status: Never     Smokeless tobacco: Never   Substance Use Topics    Alcohol use: Yes     Comment: socially; twice per month    Drug use: Never       Review of Systems   Constitutional:  Negative for chills and fever.   HENT:  Negative for sore throat.    Respiratory:  Negative for cough and shortness of breath.    Cardiovascular:  Negative for chest pain and palpitations.   Gastrointestinal:  Negative for constipation, diarrhea, nausea and vomiting.   Genitourinary:  Negative for dysuria and hematuria.   Musculoskeletal:  Negative for falls.   Neurological:  Negative for headaches.      Outpatient Encounter Medications as of 10/28/2022   Medication Sig Dispense Refill    busPIRone (BUSPAR) 15 MG tablet Take 1 tablet (15 mg total) by mouth 3 (three) times daily. 90 tablet 2    levonorgestreL (MIRENA) 20 mcg/24 hours (6 yrs) 52 mg IUD 1 each by Intrauterine route once.      lisdexamfetamine (VYVANSE) 30 MG capsule Take 1 capsule (30 mg total) by mouth every morning. 30 capsule 0    LORazepam (ATIVAN) 0.5 MG tablet Take 0.5 mg by mouth daily as needed for Anxiety.      multivitamin (THERAGRAN) per tablet Take 1 tablet by mouth once daily.      traZODone (DESYREL) 50 MG tablet Take 1 tablet (50 mg total) by mouth nightly as needed for Insomnia. 30 tablet 11    albuterol (VENTOLIN HFA) 90 mcg/actuation inhaler Inhale 2 puffs into the lungs every 6 (six) hours as needed for Wheezing. Rescue 18 g 3    [DISCONTINUED] albuterol (VENTOLIN HFA) 90 mcg/actuation inhaler Inhale 2 puffs into the lungs every 6 (six) hours as needed for Wheezing. Rescue 18 g 0     No facility-administered encounter medications on file as of 10/28/2022.        Review of patient's allergies indicates:  No Known Allergies    Physical Exam      Vital Signs  Temp: 97.8 °F (36.6 °C)  Pulse: 99  SpO2: 99 %  BP: 110/60  Height and Weight  Weight: 48 kg (105 lb 13.1 oz)]    Physical Exam  Constitutional:       Appearance: She is well-developed.   HENT:      Head:  Normocephalic and atraumatic.      Right Ear: External ear normal.      Left Ear: External ear normal.   Eyes:      General:         Right eye: No discharge.         Left eye: No discharge.   Cardiovascular:      Rate and Rhythm: Normal rate and regular rhythm.      Heart sounds: Normal heart sounds. No murmur heard.  Pulmonary:      Effort: Pulmonary effort is normal. No respiratory distress.      Breath sounds: Normal breath sounds.   Abdominal:      General: There is no distension.      Palpations: Abdomen is soft.      Tenderness: There is no abdominal tenderness. There is no guarding.   Musculoskeletal:         General: Normal range of motion.      Cervical back: Normal range of motion.   Skin:     General: Skin is warm and dry.   Neurological:      Mental Status: She is alert and oriented to person, place, and time.   Psychiatric:         Behavior: Behavior normal.        Laboratory:  CBC:  No results for input(s): WBC, RBC, HGB, HCT, PLT, MCV, MCH, MCHC in the last 2160 hours.  CMP:  No results for input(s): GLU, CALCIUM, ALBUMIN, PROT, NA, K, CO2, CL, BUN, ALKPHOS, ALT, AST, BILITOT in the last 2160 hours.    Invalid input(s): CREATININ  URINALYSIS:  No results for input(s): COLORU, CLARITYU, SPECGRAV, PHUR, PROTEINUA, GLUCOSEU, BILIRUBINCON, BLOODU, WBCU, RBCU, BACTERIA, MUCUS, NITRITE, LEUKOCYTESUR, UROBILINOGEN, HYALINECASTS in the last 2160 hours.   LIPIDS:  No results for input(s): TSH, HDL, CHOL, TRIG, LDLCALC, CHOLHDL, NONHDLCHOL, TOTALCHOLEST in the last 2160 hours.  TSH:  No results for input(s): TSH in the last 2160 hours.  A1C:  No results for input(s): HGBA1C in the last 2160 hours.    Radiology:  No results found in the last 30 days.     Assessment/Plan     Tobi Dennies is a 42 y.o.female with:    1. Annual physical exam  - CBC Auto Differential; Future  - Lipid Panel; Future  - Comprehensive Metabolic Panel; Future  - TSH; Future  - Hemoglobin A1C; Future    2. ADHD (attention deficit  hyperactivity disorder), combined type    3. Generalized anxiety disorder    4. Mild intermittent asthma without complication  - albuterol (VENTOLIN HFA) 90 mcg/actuation inhaler; Inhale 2 puffs into the lungs every 6 (six) hours as needed for Wheezing. Rescue  Dispense: 18 g; Refill: 3    5. Primary insomnia    6. Encounter for screening mammogram for malignant neoplasm of breast  - Mammo Digital Screening Bilat w/ Marino; Future    7. Benign cyst of left breast  - US Breast Left Limited; Future    -declined COVID booster and TDap  -Continue current medications and maintain follow up with specialists.    -Follow up in about 1 year (around 10/28/2023) for Annual Visit.       Meryl Hall MD  [facility]

## 2022-11-12 ENCOUNTER — LAB VISIT (OUTPATIENT)
Dept: LAB | Facility: HOSPITAL | Age: 42
End: 2022-11-12
Attending: INTERNAL MEDICINE
Payer: COMMERCIAL

## 2022-11-12 DIAGNOSIS — Z00.00 ANNUAL PHYSICAL EXAM: ICD-10-CM

## 2022-11-12 LAB
ALBUMIN SERPL BCP-MCNC: 4.4 G/DL (ref 3.5–5.2)
ALP SERPL-CCNC: 50 U/L (ref 55–135)
ALT SERPL W/O P-5'-P-CCNC: 7 U/L (ref 10–44)
ANION GAP SERPL CALC-SCNC: 8 MMOL/L (ref 8–16)
AST SERPL-CCNC: 11 U/L (ref 10–40)
BASOPHILS # BLD AUTO: 0.05 K/UL (ref 0–0.2)
BASOPHILS NFR BLD: 0.9 % (ref 0–1.9)
BILIRUB SERPL-MCNC: 0.6 MG/DL (ref 0.1–1)
BUN SERPL-MCNC: 7 MG/DL (ref 6–20)
CALCIUM SERPL-MCNC: 9.5 MG/DL (ref 8.7–10.5)
CHLORIDE SERPL-SCNC: 106 MMOL/L (ref 95–110)
CHOLEST SERPL-MCNC: 150 MG/DL (ref 120–199)
CHOLEST/HDLC SERPL: 2.5 {RATIO} (ref 2–5)
CO2 SERPL-SCNC: 26 MMOL/L (ref 23–29)
CREAT SERPL-MCNC: 0.7 MG/DL (ref 0.5–1.4)
DIFFERENTIAL METHOD: NORMAL
EOSINOPHIL # BLD AUTO: 0.2 K/UL (ref 0–0.5)
EOSINOPHIL NFR BLD: 4.1 % (ref 0–8)
ERYTHROCYTE [DISTWIDTH] IN BLOOD BY AUTOMATED COUNT: 11.9 % (ref 11.5–14.5)
EST. GFR  (NO RACE VARIABLE): >60 ML/MIN/1.73 M^2
ESTIMATED AVG GLUCOSE: 100 MG/DL (ref 68–131)
GLUCOSE SERPL-MCNC: 83 MG/DL (ref 70–110)
HBA1C MFR BLD: 5.1 % (ref 4–5.6)
HCT VFR BLD AUTO: 40.4 % (ref 37–48.5)
HDLC SERPL-MCNC: 59 MG/DL (ref 40–75)
HDLC SERPL: 39.3 % (ref 20–50)
HGB BLD-MCNC: 13 G/DL (ref 12–16)
IMM GRANULOCYTES # BLD AUTO: 0.01 K/UL (ref 0–0.04)
IMM GRANULOCYTES NFR BLD AUTO: 0.2 % (ref 0–0.5)
LDLC SERPL CALC-MCNC: 81.6 MG/DL (ref 63–159)
LYMPHOCYTES # BLD AUTO: 2.1 K/UL (ref 1–4.8)
LYMPHOCYTES NFR BLD: 39.9 % (ref 18–48)
MCH RBC QN AUTO: 29.9 PG (ref 27–31)
MCHC RBC AUTO-ENTMCNC: 32.2 G/DL (ref 32–36)
MCV RBC AUTO: 93 FL (ref 82–98)
MONOCYTES # BLD AUTO: 0.4 K/UL (ref 0.3–1)
MONOCYTES NFR BLD: 8.1 % (ref 4–15)
NEUTROPHILS # BLD AUTO: 2.5 K/UL (ref 1.8–7.7)
NEUTROPHILS NFR BLD: 46.8 % (ref 38–73)
NONHDLC SERPL-MCNC: 91 MG/DL
NRBC BLD-RTO: 0 /100 WBC
PLATELET # BLD AUTO: 265 K/UL (ref 150–450)
PMV BLD AUTO: 10.3 FL (ref 9.2–12.9)
POTASSIUM SERPL-SCNC: 4.4 MMOL/L (ref 3.5–5.1)
PROT SERPL-MCNC: 7 G/DL (ref 6–8.4)
RBC # BLD AUTO: 4.35 M/UL (ref 4–5.4)
SODIUM SERPL-SCNC: 140 MMOL/L (ref 136–145)
TRIGL SERPL-MCNC: 47 MG/DL (ref 30–150)
TSH SERPL DL<=0.005 MIU/L-ACNC: 1.5 UIU/ML (ref 0.4–4)
WBC # BLD AUTO: 5.34 K/UL (ref 3.9–12.7)

## 2022-11-12 PROCEDURE — 83036 HEMOGLOBIN GLYCOSYLATED A1C: CPT | Performed by: INTERNAL MEDICINE

## 2022-11-12 PROCEDURE — 84443 ASSAY THYROID STIM HORMONE: CPT | Performed by: INTERNAL MEDICINE

## 2022-11-12 PROCEDURE — 85025 COMPLETE CBC W/AUTO DIFF WBC: CPT | Performed by: INTERNAL MEDICINE

## 2022-11-12 PROCEDURE — 80061 LIPID PANEL: CPT | Performed by: INTERNAL MEDICINE

## 2022-11-12 PROCEDURE — 80053 COMPREHEN METABOLIC PANEL: CPT | Performed by: INTERNAL MEDICINE

## 2022-11-12 PROCEDURE — 36415 COLL VENOUS BLD VENIPUNCTURE: CPT | Performed by: INTERNAL MEDICINE

## 2022-11-30 ENCOUNTER — PATIENT MESSAGE (OUTPATIENT)
Dept: PSYCHIATRY | Facility: CLINIC | Age: 42
End: 2022-11-30
Payer: COMMERCIAL

## 2022-12-06 ENCOUNTER — OFFICE VISIT (OUTPATIENT)
Dept: OBSTETRICS AND GYNECOLOGY | Facility: CLINIC | Age: 42
End: 2022-12-06
Payer: COMMERCIAL

## 2022-12-06 VITALS
HEIGHT: 61 IN | SYSTOLIC BLOOD PRESSURE: 118 MMHG | BODY MASS INDEX: 19.35 KG/M2 | WEIGHT: 102.5 LBS | DIASTOLIC BLOOD PRESSURE: 68 MMHG

## 2022-12-06 DIAGNOSIS — Z12.31 BREAST CANCER SCREENING BY MAMMOGRAM: ICD-10-CM

## 2022-12-06 DIAGNOSIS — Z12.11 COLON CANCER SCREENING: ICD-10-CM

## 2022-12-06 DIAGNOSIS — Z01.419 ENCOUNTER FOR ANNUAL ROUTINE GYNECOLOGICAL EXAMINATION: Primary | ICD-10-CM

## 2022-12-06 PROCEDURE — 3044F HG A1C LEVEL LT 7.0%: CPT | Mod: CPTII,S$GLB,, | Performed by: OBSTETRICS & GYNECOLOGY

## 2022-12-06 PROCEDURE — 1159F MED LIST DOCD IN RCRD: CPT | Mod: CPTII,S$GLB,, | Performed by: OBSTETRICS & GYNECOLOGY

## 2022-12-06 PROCEDURE — 3008F BODY MASS INDEX DOCD: CPT | Mod: CPTII,S$GLB,, | Performed by: OBSTETRICS & GYNECOLOGY

## 2022-12-06 PROCEDURE — 3044F PR MOST RECENT HEMOGLOBIN A1C LEVEL <7.0%: ICD-10-PCS | Mod: CPTII,S$GLB,, | Performed by: OBSTETRICS & GYNECOLOGY

## 2022-12-06 PROCEDURE — 3074F PR MOST RECENT SYSTOLIC BLOOD PRESSURE < 130 MM HG: ICD-10-PCS | Mod: CPTII,S$GLB,, | Performed by: OBSTETRICS & GYNECOLOGY

## 2022-12-06 PROCEDURE — 1159F PR MEDICATION LIST DOCUMENTED IN MEDICAL RECORD: ICD-10-PCS | Mod: CPTII,S$GLB,, | Performed by: OBSTETRICS & GYNECOLOGY

## 2022-12-06 PROCEDURE — 3008F PR BODY MASS INDEX (BMI) DOCUMENTED: ICD-10-PCS | Mod: CPTII,S$GLB,, | Performed by: OBSTETRICS & GYNECOLOGY

## 2022-12-06 PROCEDURE — 3074F SYST BP LT 130 MM HG: CPT | Mod: CPTII,S$GLB,, | Performed by: OBSTETRICS & GYNECOLOGY

## 2022-12-06 PROCEDURE — 3078F PR MOST RECENT DIASTOLIC BLOOD PRESSURE < 80 MM HG: ICD-10-PCS | Mod: CPTII,S$GLB,, | Performed by: OBSTETRICS & GYNECOLOGY

## 2022-12-06 PROCEDURE — 3078F DIAST BP <80 MM HG: CPT | Mod: CPTII,S$GLB,, | Performed by: OBSTETRICS & GYNECOLOGY

## 2022-12-06 PROCEDURE — 99396 PREV VISIT EST AGE 40-64: CPT | Mod: S$GLB,,, | Performed by: OBSTETRICS & GYNECOLOGY

## 2022-12-06 PROCEDURE — 99396 PR PREVENTIVE VISIT,EST,40-64: ICD-10-PCS | Mod: S$GLB,,, | Performed by: OBSTETRICS & GYNECOLOGY

## 2022-12-06 NOTE — PATIENT INSTRUCTIONS
Please check out the [employer] PATIENT WEBSITE.  The site has education materials, patient stories, expert views, and a portal for you to ask questions.       [email or web address]      As always, please let me know if you have any questions.     Dr. Katherine Bocanegra

## 2022-12-06 NOTE — PROGRESS NOTES
Chief Complaint: Well Woman Exam     HPI:      Tobi Dennies is a 42 y.o.  who presents today for well woman exam.  LMP: No LMP recorded. (Menstrual status: Birth Control).  No issues, problems, or complaints. Specifically, patient denies abnormal vaginal bleeding, discharge, pelvic pain, urinary problems, or changes in appetite. AUB well controlled with Mirena IUD.  Unsure of insertion date, believes .  Ms. Dennies is currently sexually active with a single male partner. She is currently using IUD for contraception. She declines STD screening today.    Previous Pap:  no abnormalities (2021)  Previous Mammogram:      Assessment    Overall   2 - Benign     Mammography Recommendations     Side Due   Routine Screening Mammogram in 1 year Left Resolved       Breast Density     Overall   Breast Composition d - Extremely dense     Details    Reading Physician Reading Date Result Priority   Sugar Ervin MD  [phone]  [phone] 1/3/2022 Routine     Physician Responsible for MQSA Outcome Reason    Sugar Ervin MD Signed      Narrative & Impression  Result:   Mammo Digital Diagnostic Right with Marino  US Breast Bilateral Limited     History:  Patient is 41 y.o. and is seen for inconclusive screening mammogram.     Films Compared:  Compared to: 2021 Mammo Digital Screening Bilat w/ Marino     Findings:  This procedure was performed using tomosynthesis. Computer-aided detection was utilized in the interpretation of this examination.  The breasts are extremely dense, which lowers the sensitivity of mammography.  Asymmetry at the posterior lateral right breast effaces with compression.     US Breast Bilateral Limited  Left  Cyst: There is a 44 mm oval simple cyst with circumscribed margins seen in the central region of the left breast. The cyst correlates with the prior mammogram finding.   There is no evidence of suspicious masses or other abnormal findings in the left breast.      Right  There is no evidence of suspicious masses, calcifications, or other abnormal findings in the right breast.     Impression:  Bilateral  There is no mammographic or sonographic evidence of malignancy.     BI-RADS Category:   Overall: 2 - Benign     Recommendation:  Routine screening mammogram in 1 year is recommended.     Your estimated lifetime risk of breast cancer (to age 85) based on Tyrer-Cuzick risk assessment model is Tyrer-Cuzick: 8.41 %. According to the American Cancer Society, patients with a lifetime breast cancer risk of 20% or higher might benefit from supplemental screening tests.                Exam Ended: 22 11:57 Last Resulted: 22 16:44              Most Recent Dexa: not indicated  Colonoscopy: never had    COVID vaccine: completed series  Gardasil:has never had     Patient Active Problem List   Diagnosis    Mild intermittent asthma without complication    Generalized anxiety disorder    Seasonal allergies    Insomnia    ADHD (attention deficit hyperactivity disorder), combined type       Past Medical History:   Diagnosis Date    Anxiety 2010    Asthma     age 4       Past Surgical History:   Procedure Laterality Date    cyst removal of scalp         OB History          1    Para   1    Term   1            AB        Living   1         SAB        IAB        Ectopic        Multiple        Live Births   1           Obstetric Comments   Menarche at 13  No abnormal pap  No STDs               ROS:     Review of Systems   Constitutional:  Negative for activity change, appetite change and fatigue.   Respiratory:  Negative for shortness of breath.    Cardiovascular:  Negative for chest pain.   Gastrointestinal:  Negative for abdominal pain, constipation and diarrhea.   Endocrine: Negative for cold intolerance and heat intolerance.   Genitourinary:  Negative for dysuria, frequency, menstrual irregularity, pelvic pain and vaginal discharge.   Integumentary:  Negative for  "breast mass, breast discharge and breast tenderness.   Psychiatric/Behavioral:  Negative for dysphoric mood. The patient is not nervous/anxious.    Breast: Negative for mass and tenderness    Physical Exam:      PHYSICAL EXAM:  /68   Ht 5' 1" (1.549 m)   Wt 46.5 kg (102 lb 8.2 oz)   BMI 19.37 kg/m²   Body mass index is 19.37 kg/m².     APPEARANCE: Well nourished, well developed, in no acute distress.  PSYCH: Appropriate mood and affect.  SKIN: No acne or hirsutism  NECK: Neck symmetric without masses or thyromegaly  NODES: No inguinal, axillary, or supraclavicular lymph node enlargement  ABDOMEN: Soft.  No tenderness or masses.    CARDIOVASCULAR: No edema of peripheral extremities  BREASTS: Symmetrical, no skin changes or visible lesions.  No palpable masses or nipple discharge bilaterally.  PELVIC: Normal external genitalia without lesions.  Normal hair distribution.  Adequate perineal body, normal urethral meatus.  Vagina moist and well rugated without lesions or discharge.  Cervix pink, without lesions, discharge or tenderness.  No significant cystocele or rectocele.  Bimanual exam shows uterus to be normal size, regular, mobile and nontender.  Adnexa without masses or tenderness.      Assessment:     1. Encounter for annual routine gynecological examination        2. Breast cancer screening by mammogram        3. Colon cancer screening  Ambulatory referral/consult to Endo Procedure             Plan:     Clinical breast exam performed.   Pap UTD.  Mammogram ordered by PCP.  DEXA at 63 or as needed.  Colonoscopy at 43 or as needed.  Contraception: IUD.  Considering bilateral salpingectomy/endometrial ablation. Will request IUD insertion records.   Follow up for annual well woman exam or as needed.    Counseling:     Patient was counseled today on current ASCCP pap guidelines, the recommendation for yearly pelvic exams, healthy diet and exercise routines, breast self awareness and annual " mammograms.She is to see her PCP for other health maintenance.     Use of the [facility] Patient Portal discussed and encouraged during today's visit.         Katherine Bocanegra MD  [facility]  12/06/2022

## 2022-12-30 ENCOUNTER — OFFICE VISIT (OUTPATIENT)
Dept: PSYCHIATRY | Facility: CLINIC | Age: 42
End: 2022-12-30
Payer: COMMERCIAL

## 2022-12-30 DIAGNOSIS — F90.2 ADHD (ATTENTION DEFICIT HYPERACTIVITY DISORDER), COMBINED TYPE: Primary | ICD-10-CM

## 2022-12-30 DIAGNOSIS — F41.1 GENERALIZED ANXIETY DISORDER: ICD-10-CM

## 2022-12-30 PROCEDURE — 99214 OFFICE O/P EST MOD 30 MIN: CPT | Mod: 95,,, | Performed by: NURSE PRACTITIONER

## 2022-12-30 PROCEDURE — 3044F HG A1C LEVEL LT 7.0%: CPT | Mod: CPTII,95,, | Performed by: NURSE PRACTITIONER

## 2022-12-30 PROCEDURE — 3044F PR MOST RECENT HEMOGLOBIN A1C LEVEL <7.0%: ICD-10-PCS | Mod: CPTII,95,, | Performed by: NURSE PRACTITIONER

## 2022-12-30 PROCEDURE — 99214 PR OFFICE/OUTPT VISIT, EST, LEVL IV, 30-39 MIN: ICD-10-PCS | Mod: 95,,, | Performed by: NURSE PRACTITIONER

## 2022-12-30 RX ORDER — BUSPIRONE HYDROCHLORIDE 15 MG/1
15 TABLET ORAL 2 TIMES DAILY
Qty: 60 TABLET | Refills: 2 | Status: SHIPPED | OUTPATIENT
Start: 2022-12-30 | End: 2023-04-14 | Stop reason: SDUPTHER

## 2022-12-30 RX ORDER — LISDEXAMFETAMINE DIMESYLATE 30 MG/1
30 CAPSULE ORAL EVERY MORNING
Qty: 30 CAPSULE | Refills: 0 | Status: SHIPPED | OUTPATIENT
Start: 2022-12-30 | End: 2023-02-01 | Stop reason: SDUPTHER

## 2022-12-30 NOTE — PROGRESS NOTES
"Outpatient Psychiatry Follow-Up Visit (MD/NP)    12/30/2022    Clinical Status of Patient:  Outpatient (Ambulatory)    Chief Complaint:  Tobi Dennies is a 42 y.o. female who presents today for follow-up of anxiety and attention problems.  Met with patient. Patient is a transfer from Rosa Maria Skinner NP. This is writer's first visit with the patient.    The patient location is: [place]   The chief complaint leading to consultation is: ADHD/anxiety    Visit type: audiovisual    Each patient to whom he or she provides medical services by telemedicine is:  (1) informed of the relationship between the physician and patient and the respective role of any other health care provider with respect to management of the patient; and (2) notified that he or she may decline to receive medical services by telemedicine and may withdraw from such care at any time.    Notes:       Interval History and Content of Current Session:    "Since she's prescribed the Vyvanse that's controlled the anxiety that I had all of the time."    Patient reports that her anxiety has improved significantly since starting Vyvanse, "I was always thinking of something else." She would be anxious, have many things going at once, couldn't finish anything. Patient reports general nervousness without identifiable trigger. Improved significantly since starting Vyvanse, now anxiety is situational, "I don't have anxiety unless it's triggered." Patient reports that work has been much more manageable, better able to complete tasks fully, not overwhelmed when given multiple tasks, "not on edge anymore." Patient denies adverse effects at this time, aside from mild appetite suppression, most recent weight 102 lb on 12/6/22. She reports stable mood, affect is euthymic. Denies depression. No evidence of shad. No psychosis. No lethality concerns.     HPI/Psychiatric Review Of Systems (is patient experiencing or having changes in):    Mood: euthymic mood and " affect  Anhedonia/interest: denies  Guilt/hopelessness: denies   Concentration: improved  Sleep: no change from baseline, 6-7 hours nightly   Energy: adequate, denies fatigue   Appetite: somewhat decreased, mild weight loss (~ 102 lb)  SI/SIB/VI/HI: denies  Anxiety/panic: improved, less excessive worry  Paranoia: denies  AVH: denies  Substance use: non-smoker. Decreased ETOH use, previously had 1 drink after work daily due to stress, now rarely drinks. Denies other substance use.      Medications:    Vyvanse 30 mg daily -- takes daily, denies SE  Buspirone 15 mg TID -- takes 1-2x daily, change to BID dosing  Trazodone 50 mg PRN for insomnia -- not taking    OTC -- melatonin, multivitamins     Previous medications: buspar, lexapro, zoloft, ativan, trazodone    Brief synopsis:  Improvement in anxiety/attention    Review of Systems   PSYCHIATRIC: Pertinant items are noted in the narrative.  CONSTITUTIONAL: No weight gain or loss.   MUSCULOSKELETAL: No pain or stiffness of the joints.  NEUROLOGIC: No weakness, sensory changes, seizures, confusion, memory loss, tremor or other abnormal movements.  GASTROINTESTINAL: No nausea, vomiting, pain, constipation or diarrhea.    Past Medical, Family and Social History: The patient's past medical, family and social history have been reviewed and updated as appropriate within the electronic medical record - see encounter notes.    Compliance: see above    Side effects: see above    Risk Parameters:  Patient reports no suicidal ideation  Patient reports no homicidal ideation  Patient reports no self-injurious behavior  Patient reports no violent behavior    Exam (detailed: at least 9 elements; comprehensive: all 15 elements)   Constitutional  Vitals:  Most recent vital signs, dated greater than 90 days prior to this appointment, were reviewed.   There were no vitals filed for this visit.     General:  unremarkable, age appropriate     Musculoskeletal  Muscle Strength/Tone:  not  examined   Gait & Station:  QUENTIN     Psychiatric  Speech:  no latency; no press   Mood & Affect:  euthymic  congruent and appropriate   Thought Process:  normal and logical   Associations:  intact   Thought Content:  normal, no suicidality, no homicidality, delusions, or paranoia   Insight:  intact   Judgement: behavior is adequate to circumstances   Orientation:  grossly intact   Memory: intact for content of interview   Language: grossly intact   Attention Span & Concentration:  able to focus   Fund of Knowledge:  intact and appropriate to age and level of education     Assessment and Diagnosis   Status/Progress: Based on the examination today, the patient's problem(s) is/are improved.  New problems have not been presented today.   Co-morbidities, Diagnostic uncertainty, and Lack of compliance are not complicating management of the primary condition.  There are no active rule-out diagnoses for this patient at this time.     General Impression: Tobi Dennies is a 42 y.o. female with a psychiatric hx of anxiety and recently diagnosed ADHD. Medical hx is largely benign. Family MH hx significant for anxiety. Patient with psychotropic treatment of anxiety with SSRIs with minimal effect. Started on Vyvanse in 10/2022 with positive effect on attention deficit and anxiety sx. No hx of psychiatric hospitalizations. No hx of suicide attempts, self-injurious behavior, or violence. No hx of substance abuse.         ICD-10-CM ICD-9-CM   1. ADHD (attention deficit hyperactivity disorder), combined type  F90.2 314.01   2. Generalized anxiety disorder  F41.1 300.02       Intervention/Counseling/Treatment Plan   Reviewed patient's symptoms and medication regimen  Reports positive effect with Vyvanse, minimal side effects   Encouraged scheduling regular meals/snacks throughout the day  Will change buspirone to BID dosing due to difficulty complying with TID dosing schedule  Directed patient to schedule in clinic appointment so BP/HR  can be checked    Medication Management  Prescription drug management was employed during the encounter, as medications were prescribed, or considered but not prescribed.   [facility] reviewed  The risks and benefits of medication were discussed with the patient.  Possible expectable adverse effects of any current or proposed individual psychotropic agents were discussed with this patient.  Counseling was provided on the importance of full compliance with any prescribed medication.  Detailed instructions were provided to the patient regarding the administration of any prescribed medication.  Patient voiced understanding    Return to Clinic: 3 months    Face-to-face time spent: 22 minutes  35 minutes total time spent. This includes face to face time and non-face to face time preparing to see the patient (eg, review of tests), obtaining and/or reviewing separately obtained history, documenting clinical information in the electronic or other health record, independently interpreting results and communicating results to the patient/family/caregiver, or care coordinator.

## 2023-02-01 DIAGNOSIS — F90.2 ADHD (ATTENTION DEFICIT HYPERACTIVITY DISORDER), COMBINED TYPE: ICD-10-CM

## 2023-02-01 RX ORDER — LISDEXAMFETAMINE DIMESYLATE 30 MG/1
30 CAPSULE ORAL EVERY MORNING
Qty: 30 CAPSULE | Refills: 0 | Status: SHIPPED | OUTPATIENT
Start: 2023-02-01 | End: 2023-02-02

## 2023-02-02 ENCOUNTER — PATIENT MESSAGE (OUTPATIENT)
Dept: PSYCHIATRY | Facility: CLINIC | Age: 43
End: 2023-02-02
Payer: COMMERCIAL

## 2023-02-02 RX ORDER — LISDEXAMFETAMINE DIMESYLATE 30 MG/1
30 CAPSULE ORAL EVERY MORNING
Qty: 30 CAPSULE | Refills: 0 | Status: CANCELLED | OUTPATIENT
Start: 2023-02-02

## 2023-02-02 RX ORDER — LISDEXAMFETAMINE DIMESYLATE 30 MG/1
30 CAPSULE ORAL EVERY MORNING
Qty: 30 CAPSULE | Refills: 0 | Status: SHIPPED | OUTPATIENT
Start: 2023-02-02 | End: 2023-03-07 | Stop reason: SDUPTHER

## 2023-02-23 ENCOUNTER — CLINICAL SUPPORT (OUTPATIENT)
Dept: ENDOSCOPY | Facility: HOSPITAL | Age: 43
End: 2023-02-23
Attending: OBSTETRICS & GYNECOLOGY
Payer: COMMERCIAL

## 2023-02-23 VITALS — BODY MASS INDEX: 19.26 KG/M2 | WEIGHT: 102 LBS | HEIGHT: 61 IN

## 2023-02-23 DIAGNOSIS — Z12.11 COLON CANCER SCREENING: ICD-10-CM

## 2023-02-23 RX ORDER — SODIUM, POTASSIUM,MAG SULFATES 17.5-3.13G
1 SOLUTION, RECONSTITUTED, ORAL ORAL DAILY
Qty: 1 KIT | Refills: 0 | Status: SHIPPED | OUTPATIENT
Start: 2023-02-23 | End: 2023-02-25

## 2023-03-08 ENCOUNTER — HOSPITAL ENCOUNTER (OUTPATIENT)
Dept: RADIOLOGY | Facility: HOSPITAL | Age: 43
Discharge: HOME | End: 2023-03-08
Attending: INTERNAL MEDICINE
Payer: COMMERCIAL

## 2023-03-08 DIAGNOSIS — Z12.31 ENCOUNTER FOR SCREENING MAMMOGRAM FOR MALIGNANT NEOPLASM OF BREAST: ICD-10-CM

## 2023-03-08 PROCEDURE — 77063 MAMMO DIGITAL SCREENING BILAT WITH TOMO: ICD-10-PCS | Mod: 26,,, | Performed by: RADIOLOGY

## 2023-03-08 PROCEDURE — 77063 BREAST TOMOSYNTHESIS BI: CPT | Mod: 26,,, | Performed by: RADIOLOGY

## 2023-03-08 PROCEDURE — 77067 MAMMO DIGITAL SCREENING BILAT WITH TOMO: ICD-10-PCS | Mod: 26,,, | Performed by: RADIOLOGY

## 2023-03-08 PROCEDURE — 77067 SCR MAMMO BI INCL CAD: CPT | Mod: TC

## 2023-03-08 PROCEDURE — 77067 SCR MAMMO BI INCL CAD: CPT | Mod: 26,,, | Performed by: RADIOLOGY

## 2023-03-09 ENCOUNTER — PATIENT MESSAGE (OUTPATIENT)
Dept: PRIMARY CARE CLINIC | Facility: CLINIC | Age: 43
End: 2023-03-09
Payer: COMMERCIAL

## 2023-03-10 ENCOUNTER — PATIENT MESSAGE (OUTPATIENT)
Dept: OBSTETRICS AND GYNECOLOGY | Facility: CLINIC | Age: 43
End: 2023-03-10
Payer: COMMERCIAL

## 2023-03-10 DIAGNOSIS — N63.20 MASS OF LEFT BREAST, UNSPECIFIED QUADRANT: Primary | ICD-10-CM

## 2023-04-14 ENCOUNTER — PATIENT MESSAGE (OUTPATIENT)
Dept: PSYCHIATRY | Facility: CLINIC | Age: 43
End: 2023-04-14
Payer: COMMERCIAL

## 2023-04-14 ENCOUNTER — OFFICE VISIT (OUTPATIENT)
Dept: PSYCHIATRY | Facility: CLINIC | Age: 43
End: 2023-04-14
Payer: COMMERCIAL

## 2023-04-14 VITALS
SYSTOLIC BLOOD PRESSURE: 98 MMHG | DIASTOLIC BLOOD PRESSURE: 60 MMHG | HEART RATE: 121 BPM | BODY MASS INDEX: 18.22 KG/M2 | WEIGHT: 96.44 LBS

## 2023-04-14 DIAGNOSIS — F41.1 GENERALIZED ANXIETY DISORDER: ICD-10-CM

## 2023-04-14 DIAGNOSIS — F90.2 ADHD (ATTENTION DEFICIT HYPERACTIVITY DISORDER), COMBINED TYPE: Primary | ICD-10-CM

## 2023-04-14 PROCEDURE — 3078F DIAST BP <80 MM HG: CPT | Mod: CPTII,S$GLB,, | Performed by: NURSE PRACTITIONER

## 2023-04-14 PROCEDURE — 99214 OFFICE O/P EST MOD 30 MIN: CPT | Mod: S$GLB,,, | Performed by: NURSE PRACTITIONER

## 2023-04-14 PROCEDURE — 3008F PR BODY MASS INDEX (BMI) DOCUMENTED: ICD-10-PCS | Mod: CPTII,S$GLB,, | Performed by: NURSE PRACTITIONER

## 2023-04-14 PROCEDURE — 99999 PR PBB SHADOW E&M-EST. PATIENT-LVL III: CPT | Mod: PBBFAC,,, | Performed by: NURSE PRACTITIONER

## 2023-04-14 PROCEDURE — 3074F SYST BP LT 130 MM HG: CPT | Mod: CPTII,S$GLB,, | Performed by: NURSE PRACTITIONER

## 2023-04-14 PROCEDURE — 3074F PR MOST RECENT SYSTOLIC BLOOD PRESSURE < 130 MM HG: ICD-10-PCS | Mod: CPTII,S$GLB,, | Performed by: NURSE PRACTITIONER

## 2023-04-14 PROCEDURE — 99999 PR PBB SHADOW E&M-EST. PATIENT-LVL III: ICD-10-PCS | Mod: PBBFAC,,, | Performed by: NURSE PRACTITIONER

## 2023-04-14 PROCEDURE — 99214 PR OFFICE/OUTPT VISIT, EST, LEVL IV, 30-39 MIN: ICD-10-PCS | Mod: S$GLB,,, | Performed by: NURSE PRACTITIONER

## 2023-04-14 PROCEDURE — 3078F PR MOST RECENT DIASTOLIC BLOOD PRESSURE < 80 MM HG: ICD-10-PCS | Mod: CPTII,S$GLB,, | Performed by: NURSE PRACTITIONER

## 2023-04-14 PROCEDURE — 3008F BODY MASS INDEX DOCD: CPT | Mod: CPTII,S$GLB,, | Performed by: NURSE PRACTITIONER

## 2023-04-14 RX ORDER — LISDEXAMFETAMINE DIMESYLATE 30 MG/1
30 CAPSULE ORAL EVERY MORNING
Qty: 30 CAPSULE | Refills: 0 | Status: SHIPPED | OUTPATIENT
Start: 2023-04-14 | End: 2023-05-16 | Stop reason: SDUPTHER

## 2023-04-14 RX ORDER — BUSPIRONE HYDROCHLORIDE 15 MG/1
15 TABLET ORAL 2 TIMES DAILY
Qty: 60 TABLET | Refills: 2 | Status: SHIPPED | OUTPATIENT
Start: 2023-04-28 | End: 2023-07-13 | Stop reason: SDUPTHER

## 2023-04-14 NOTE — PROGRESS NOTES
"Outpatient Psychiatry Follow-Up Visit (MD/NP)    4/14/2023    Clinical Status of Patient:  Outpatient (Ambulatory)    Chief Complaint:  Tobi Dennies is a 43 y.o. female who presents today for follow-up of anxiety and attention problems.  Met with patient.     Interval History and Content of Current Session:    Social update -- continued behavioral concerns with 21 y/o daughter, reached a breaking point, patient gave her ultimatum and she moved out of the home last weekend. 16 y/o cat has been having medical issues, unclear diagnosis at this time.    Patient presents with euthymic mood and affect. She reports situation involving her daughter has been difficult, though feels it will be better for her mental well being in the long run. Reports her mood has been stable, denies depression. Finds enjoyment in typical activities, particularly spending time with boyfriend and friends. Anxiety remains adequately controlled. Denies persistent excessive worry, now normative in response to stressful situations. Patient reports that work continues to be going well. Feels less anxious about completing tasks, "before I would get paralyzed." Remains better able to start and take tasks to completion without feeling distracted. Denies lethality concerns. No shad. No psychosis.     HPI/Psychiatric Review Of Systems (is patient experiencing or having changes in):    Mood: euthymic mood and affect   Anhedonia/interest: denies  Guilt/hopelessness: denies  Concentration: adequate, no change from previous appointment   Sleep: adequate, sometimes interrupted by cat in the middle of the night   Energy: no change from baseline   Appetite: baseline, 2 meals daily and snacks, cut out bread in February, weight 96 lb today (102 lb in December 2022).   SI/SIB/VI/HI: denies all  Anxiety/panic: manageable, denies persistent excessive worry   Paranoia: denies  AVH: denies   Substance use: social ETOH occasionally, no daily use. No other substance " use.     Medications:    Vyvanse 30 mg daily -- takes daily, denies SE  Buspirone 15 mg BID -- takes BID, denies SE  Trazodone 50 mg PRN for insomnia -- not taking    OTC -- melatonin, multivitamins     Previous medications: lexapro, zoloft, ativan, trazodone    Brief synopsis:  Sx adequately controlled    Review of Systems   PSYCHIATRIC: Pertinant items are noted in the narrative.  CONSTITUTIONAL: No weight gain or loss.   MUSCULOSKELETAL: No pain or stiffness of the joints.  NEUROLOGIC: No weakness, sensory changes, seizures, confusion, memory loss, tremor or other abnormal movements.  GASTROINTESTINAL: No nausea, vomiting, pain, constipation or diarrhea.    Past Medical, Family and Social History: The patient's past medical, family and social history have been reviewed and updated as appropriate within the electronic medical record - see encounter notes.    Compliance: see above    Side effects: see above    Risk Parameters:  Patient reports no suicidal ideation  Patient reports no homicidal ideation  Patient reports no self-injurious behavior  Patient reports no violent behavior    Exam (detailed: at least 9 elements; comprehensive: all 15 elements)   Constitutional  Vitals:  Most recent vital signs, dated greater than 90 days prior to this appointment, were reviewed.   Vitals:    04/14/23 1525 04/14/23 1610   BP: (!) 142/84 98/60   Pulse: (!) 135 (!) 121   Weight: 43.8 kg (96 lb 7.2 oz)         General:  unremarkable, age appropriate, dressed professionally      Musculoskeletal  Muscle Strength/Tone:  no spasicity, no rigidity, no cogwheeling   Gait & Station:  non-ataxic     Psychiatric  Speech:  no latency; no press   Mood & Affect:  euthymic  congruent and appropriate   Thought Process:  normal and logical   Associations:  intact   Thought Content:  normal, no suicidality, no homicidality, delusions, or paranoia   Insight:  intact   Judgement: behavior is adequate to circumstances   Orientation:  grossly  intact   Memory: intact for content of interview   Language: grossly intact   Attention Span & Concentration:  able to focus   Fund of Knowledge:  intact and appropriate to age and level of education     Assessment and Diagnosis   Status/Progress: Based on the examination today, the patient's problem(s) is/are improved.  New problems have not been presented today.   Co-morbidities, Diagnostic uncertainty, and Lack of compliance are not complicating management of the primary condition.  There are no active rule-out diagnoses for this patient at this time.     General Impression: Tobi Dennies is a 43 y.o. female with a psychiatric hx of LYLY and ADHD. Medical hx is largely benign. Family MH hx significant for anxiety. Patient with psychotropic treatment of anxiety with SSRIs with minimal effect. Started on Vyvanse in 10/2022 with positive effect on attention deficit and anxiety sx. No hx of psychiatric hospitalizations. No hx of suicide attempts, self-injurious behavior, or violence. No hx of substance abuse.     04/14/2023 -- sx stable. BP/HR elevated, patient attributes to rushing to appointment from garage. BP significantly decreased upon recheck, HR tachycardic.      ICD-10-CM ICD-9-CM   1. ADHD (attention deficit hyperactivity disorder), combined type  F90.2 314.01   2. Generalized anxiety disorder  F41.1 300.02         Intervention/Counseling/Treatment Plan   Reviewed patient's symptoms and medication regimen  Patient appears to be utilizing Vyvanse appropriately  Continue medications as prescribed  Encouraged patient to monitor HR, elevated today, BP WNL upon recheck     Medication Management  Prescription drug management was employed during the encounter, as medications were prescribed, or considered but not prescribed.   [facility] reviewed  The risks and benefits of medication were discussed with the patient.  Possible expectable adverse effects of any current or proposed individual psychotropic agents  were discussed with this patient.  Counseling was provided on the importance of full compliance with any prescribed medication.  Detailed instructions were provided to the patient regarding the administration of any prescribed medication.  Patient voiced understanding    Return to Clinic: 3 months    Face-to-face time spent: 29 minutes  36 minutes total time spent. This includes face to face time and non-face to face time preparing to see the patient (eg, review of tests), obtaining and/or reviewing separately obtained history, documenting clinical information in the electronic or other health record, independently interpreting results and communicating results to the patient/family/caregiver, or care coordinator.

## 2023-04-19 ENCOUNTER — HOSPITAL ENCOUNTER (EMERGENCY)
Facility: HOSPITAL | Age: 43
Discharge: HOME | End: 2023-04-19
Attending: EMERGENCY MEDICINE
Payer: COMMERCIAL

## 2023-04-19 VITALS
TEMPERATURE: 98 F | OXYGEN SATURATION: 100 % | WEIGHT: 95 LBS | DIASTOLIC BLOOD PRESSURE: 65 MMHG | BODY MASS INDEX: 18.65 KG/M2 | HEIGHT: 60 IN | HEART RATE: 86 BPM | RESPIRATION RATE: 18 BRPM | SYSTOLIC BLOOD PRESSURE: 123 MMHG

## 2023-04-19 DIAGNOSIS — R51.9 HEADACHE: ICD-10-CM

## 2023-04-19 LAB
ALBUMIN SERPL BCP-MCNC: 4.8 G/DL (ref 3.5–5.2)
ALP SERPL-CCNC: 62 U/L (ref 55–135)
ALT SERPL W/O P-5'-P-CCNC: 11 U/L (ref 10–44)
ANION GAP SERPL CALC-SCNC: 9 MMOL/L (ref 8–16)
APTT PPP: 27.8 SEC (ref 21–32)
AST SERPL-CCNC: 14 U/L (ref 10–40)
B-HCG UR QL: NEGATIVE
BASOPHILS # BLD AUTO: 0.06 K/UL (ref 0–0.2)
BASOPHILS NFR BLD: 0.8 % (ref 0–1.9)
BILIRUB SERPL-MCNC: 0.7 MG/DL (ref 0.1–1)
BUN SERPL-MCNC: 5 MG/DL (ref 6–30)
BUN SERPL-MCNC: 6 MG/DL (ref 6–20)
CALCIUM SERPL-MCNC: 9.6 MG/DL (ref 8.7–10.5)
CHLORIDE SERPL-SCNC: 105 MMOL/L (ref 95–110)
CHLORIDE SERPL-SCNC: 108 MMOL/L (ref 95–110)
CO2 SERPL-SCNC: 23 MMOL/L (ref 23–29)
CREAT SERPL-MCNC: 0.7 MG/DL (ref 0.5–1.4)
CREAT SERPL-MCNC: 0.8 MG/DL (ref 0.5–1.4)
CTP QC/QA: YES
DIFFERENTIAL METHOD: NORMAL
EOSINOPHIL # BLD AUTO: 0 K/UL (ref 0–0.5)
EOSINOPHIL NFR BLD: 0.5 % (ref 0–8)
ERYTHROCYTE [DISTWIDTH] IN BLOOD BY AUTOMATED COUNT: 12.1 % (ref 11.5–14.5)
EST. GFR  (NO RACE VARIABLE): >60 ML/MIN/1.73 M^2
GLUCOSE SERPL-MCNC: 90 MG/DL (ref 70–110)
GLUCOSE SERPL-MCNC: 91 MG/DL (ref 70–110)
HCT VFR BLD AUTO: 41.6 % (ref 37–48.5)
HCT VFR BLD CALC: 43 %PCV (ref 36–54)
HGB BLD-MCNC: 14.1 G/DL (ref 12–16)
IMM GRANULOCYTES # BLD AUTO: 0.03 K/UL (ref 0–0.04)
IMM GRANULOCYTES NFR BLD AUTO: 0.4 % (ref 0–0.5)
INR PPP: 1 (ref 0.8–1.2)
LYMPHOCYTES # BLD AUTO: 2 K/UL (ref 1–4.8)
LYMPHOCYTES NFR BLD: 26.7 % (ref 18–48)
MCH RBC QN AUTO: 30.3 PG (ref 27–31)
MCHC RBC AUTO-ENTMCNC: 33.9 G/DL (ref 32–36)
MCV RBC AUTO: 90 FL (ref 82–98)
MONOCYTES # BLD AUTO: 0.4 K/UL (ref 0.3–1)
MONOCYTES NFR BLD: 5.4 % (ref 4–15)
NEUTROPHILS # BLD AUTO: 5.1 K/UL (ref 1.8–7.7)
NEUTROPHILS NFR BLD: 66.2 % (ref 38–73)
NRBC BLD-RTO: 0 /100 WBC
PLATELET # BLD AUTO: 340 K/UL (ref 150–450)
PMV BLD AUTO: 9.2 FL (ref 9.2–12.9)
POC IONIZED CALCIUM: 1.16 MMOL/L (ref 1.06–1.42)
POC TCO2 (MEASURED): 22 MMOL/L (ref 23–29)
POTASSIUM BLD-SCNC: 3.9 MMOL/L (ref 3.5–5.1)
POTASSIUM SERPL-SCNC: 3.9 MMOL/L (ref 3.5–5.1)
PROT SERPL-MCNC: 7.8 G/DL (ref 6–8.4)
PROTHROMBIN TIME: 10.5 SEC (ref 9–12.5)
RBC # BLD AUTO: 4.65 M/UL (ref 4–5.4)
SAMPLE: ABNORMAL
SARS-COV-2 RDRP RESP QL NAA+PROBE: NEGATIVE
SODIUM BLD-SCNC: 141 MMOL/L (ref 136–145)
SODIUM SERPL-SCNC: 140 MMOL/L (ref 136–145)
TSH SERPL DL<=0.005 MIU/L-ACNC: 1.94 UIU/ML (ref 0.4–4)
WBC # BLD AUTO: 7.65 K/UL (ref 3.9–12.7)

## 2023-04-19 PROCEDURE — 80047 BASIC METABLC PNL IONIZED CA: CPT

## 2023-04-19 PROCEDURE — U0002 COVID-19 LAB TEST NON-CDC: HCPCS | Performed by: PHYSICIAN ASSISTANT

## 2023-04-19 PROCEDURE — 96374 THER/PROPH/DIAG INJ IV PUSH: CPT | Mod: 59

## 2023-04-19 PROCEDURE — 99284 PR EMERGENCY DEPT VISIT,LEVEL IV: ICD-10-PCS | Mod: GC,,, | Performed by: PHYSICIAN ASSISTANT

## 2023-04-19 PROCEDURE — 93010 EKG 12-LEAD: ICD-10-PCS | Mod: ,,, | Performed by: INTERNAL MEDICINE

## 2023-04-19 PROCEDURE — 93010 ELECTROCARDIOGRAM REPORT: CPT | Mod: ,,, | Performed by: INTERNAL MEDICINE

## 2023-04-19 PROCEDURE — 93005 ELECTROCARDIOGRAM TRACING: CPT

## 2023-04-19 PROCEDURE — 96361 HYDRATE IV INFUSION ADD-ON: CPT

## 2023-04-19 PROCEDURE — 99285 EMERGENCY DEPT VISIT HI MDM: CPT | Mod: 25

## 2023-04-19 PROCEDURE — 81025 URINE PREGNANCY TEST: CPT | Performed by: PHYSICIAN ASSISTANT

## 2023-04-19 PROCEDURE — 99284 EMERGENCY DEPT VISIT MOD MDM: CPT | Mod: GC,,, | Performed by: PHYSICIAN ASSISTANT

## 2023-04-19 PROCEDURE — 25000003 PHARM REV CODE 250: Performed by: PHYSICIAN ASSISTANT

## 2023-04-19 PROCEDURE — 85730 THROMBOPLASTIN TIME PARTIAL: CPT | Performed by: PHYSICIAN ASSISTANT

## 2023-04-19 PROCEDURE — 84443 ASSAY THYROID STIM HORMONE: CPT | Performed by: PHYSICIAN ASSISTANT

## 2023-04-19 PROCEDURE — 63600175 PHARM REV CODE 636 W HCPCS: Performed by: PHYSICIAN ASSISTANT

## 2023-04-19 PROCEDURE — 85025 COMPLETE CBC W/AUTO DIFF WBC: CPT | Performed by: PHYSICIAN ASSISTANT

## 2023-04-19 PROCEDURE — 80053 COMPREHEN METABOLIC PANEL: CPT | Performed by: PHYSICIAN ASSISTANT

## 2023-04-19 PROCEDURE — 25500020 PHARM REV CODE 255: Performed by: EMERGENCY MEDICINE

## 2023-04-19 PROCEDURE — 85610 PROTHROMBIN TIME: CPT | Performed by: PHYSICIAN ASSISTANT

## 2023-04-19 RX ORDER — METOCLOPRAMIDE HYDROCHLORIDE 5 MG/ML
10 INJECTION INTRAMUSCULAR; INTRAVENOUS
Status: COMPLETED | OUTPATIENT
Start: 2023-04-19 | End: 2023-04-19

## 2023-04-19 RX ORDER — ACETAMINOPHEN 325 MG/1
650 TABLET ORAL
Status: COMPLETED | OUTPATIENT
Start: 2023-04-19 | End: 2023-04-19

## 2023-04-19 RX ADMIN — ACETAMINOPHEN 650 MG: 325 TABLET ORAL at 01:04

## 2023-04-19 RX ADMIN — SODIUM CHLORIDE, POTASSIUM CHLORIDE, SODIUM LACTATE AND CALCIUM CHLORIDE 1000 ML: 600; 310; 30; 20 INJECTION, SOLUTION INTRAVENOUS at 01:04

## 2023-04-19 RX ADMIN — METOCLOPRAMIDE 10 MG: 5 INJECTION, SOLUTION INTRAMUSCULAR; INTRAVENOUS at 01:04

## 2023-04-19 RX ADMIN — IOHEXOL 75 ML: 350 INJECTION, SOLUTION INTRAVENOUS at 03:04

## 2023-04-19 NOTE — ED TRIAGE NOTES
Pt to ED via personal transport c/o occipital pain since yesterday evening days. Pt states today started feeling dizzy/lightheaded when walking, brain fog. Pt also states R sided arm numbness and tingling. Took 2 advil and zyrtec this am with no relief.

## 2023-04-19 NOTE — Clinical Note
Date: 4/19/2023  Patient: Tobi Dennies  Admitted: 4/19/2023 12:29 PM  Attending Provider: Luisa Juan MD    Tobi Dennies or her authorized caregiver has made the decision for the patient to leave the emergency department against the advice of  her attending physician. She or her authorized caregiver has been informed and understands the inherent risks, including death, permanent disability and decomposition.  She or her authorized caregiver has decided to accept the responsibility for thi s decision. Tobi Dennies and all necessary parties have been advised that she may return for further evaluation or treatment. Her condition at time of discharge was stable.  Tobi Dennies had current vital signs as follows:  /68 (BP Location: [facility] arm, Patient Position: Sitting)   Pulse 91   Temp 98.4 °F (36.9 °C) (Oral)   Resp 14   Ht 5' (1.524 m)   Wt 43.1 kg (95 lb)

## 2023-04-19 NOTE — ED PROVIDER NOTES
"Encounter Date: 4/19/2023       History     Chief Complaint   Patient presents with    Headache     C/o headache for couple days.     43-year-old female with past medical history of asthma, anxiety presents emergency department for headache.  States that around 9:00 a.m. began having a posterior headache which occasionally radiates to the front.  She then felt "off" reports brain fog and tingling to the right upper extremity as well as clamminess.  Denies vertigo but states she does get lightheaded when standing.  States she took 2 Advil with no relief.  States the headache feels similar to previous headaches but typically relief with OTC medications.  Denies any blurred vision, photophobia, neck stiffness, fevers/chills, chest pain or abdominal pain.  Currently rates her pain 5/10.    The history is provided by the patient.   Review of patient's allergies indicates:  No Known Allergies  Past Medical History:   Diagnosis Date    Anxiety 2010    Asthma     age 4     Past Surgical History:   Procedure Laterality Date    cyst removal of scalp       Family History   Adopted: Yes   Problem Relation Age of Onset    ADD / ADHD Daughter 8     Social History     Tobacco Use    Smoking status: Never    Smokeless tobacco: Never   Substance Use Topics    Alcohol use: Yes     Comment: socially; twice per month    Drug use: Never     Review of Systems   Constitutional:  Negative for chills and fever.   HENT:  Negative for sore throat.    Respiratory:  Negative for cough and shortness of breath.    Cardiovascular:  Negative for chest pain.   Gastrointestinal:  Negative for abdominal pain.   Genitourinary:  Negative for difficulty urinating and dysuria.   Musculoskeletal: Negative.    Skin: Negative.    Neurological:  Positive for light-headedness and headaches. Negative for weakness.     Physical Exam     Initial Vitals [04/19/23 1116]   BP Pulse Resp Temp SpO2   132/82 90 18 97.7 °F (36.5 °C) 98 %      MAP       --     "     Physical Exam    Nursing note and vitals reviewed.  Constitutional: She appears well-developed and well-nourished. She is not diaphoretic. No distress.   HENT:   Head: Normocephalic and atraumatic.   Eyes: Conjunctivae and EOM are normal. Pupils are equal, round, and reactive to light.   Neck: Neck supple.   Normal range of motion.  Cardiovascular:  Normal rate, regular rhythm, normal heart sounds and intact distal pulses.           Pulmonary/Chest: Breath sounds normal.   Abdominal: Abdomen is soft. Bowel sounds are normal. There is no abdominal tenderness.   Musculoskeletal:         General: Normal range of motion.      Cervical back: Normal range of motion and neck supple.     Neurological: She is alert and oriented to person, place, and time. She has normal strength. No cranial nerve deficit. GCS score is 15. GCS eye subscore is 4. GCS verbal subscore is 5. GCS motor subscore is 6.   Facial features symmetric,  strength equal, negative pronator drift, normal finger-to-nose exam, Romberg, V1 through V3 intact and symmetric.  Van negative   Skin: Skin is warm and dry. Capillary refill takes less than 2 seconds.   Psychiatric: She has a normal mood and affect.       ED Course   Procedures  Labs Reviewed   ISTAT PROCEDURE - Abnormal; Notable for the following components:       Result Value    POC BUN 5 (*)     POC TCO2 (MEASURED) 22 (*)     All other components within normal limits   CBC W/ AUTO DIFFERENTIAL   COMPREHENSIVE METABOLIC PANEL   SARS-COV-2 RNA AMPLIFICATION, QUAL   APTT   TSH   PROTIME-INR   HIV 1 / 2 ANTIBODY   HEPATITIS C ANTIBODY   POCT URINE PREGNANCY   ISTAT CHEM8        ECG Results              EKG 12-lead (Final result)  Result time 04/19/23 13:41:42      Final result by Interface, Lab In [facility] (04/19/23 13:41:42)                   Narrative:    Test Reason : R51.9,    Vent. Rate : 097 BPM     Atrial Rate : 194 BPM     P-R Int : 000 ms          QRS Dur : 086 ms      QT Int : 318 ms        P-R-T Axes : 147 090 113 degrees     QTc Int : 403 ms     Suspect arm lead reversal, interpretation assumes no reversal  Normal sinus rhythm  Rightward axis  Nonspecific ST-t wave abnormality  Abnormal ECG  No previous ECGs available  Confirmed by GONZALEZ QUEVEDO MD ([id number]) on 4/19/2023 1:41:36 PM    Referred By: THUY   SELF           Confirmed By:GONZALEZ QUEVEDO MD                                  Imaging Results              MRI Brain Without Contrast (Final result)  Result time 04/19/23 20:46:02      Final result by Ricki Winter MD (04/19/23 20:46:02)                   Impression:      No acute abnormality.      Electronically signed by: Ricki Winter MD  Date:    04/19/2023  Time:    20:46               Narrative:    EXAMINATION:  MRI BRAIN WITHOUT CONTRAST    CLINICAL HISTORY:  Transient ischemic attack (TIA);Headache, new or worsening, neuro deficit (Age 19-49y);.    TECHNIQUE:  Multiplanar multisequence MR imaging of the brain was performed without contrast.    COMPARISON:  CTA head neck 04/19/2023.    FINDINGS:  Intracranial compartment:    Ventricles and sulci are normal in size for age without evidence of hydrocephalus. No extra-axial blood or fluid collections.    The brain parenchyma appears normal. No mass lesion, acute hemorrhage, edema or acute infarct.    Normal vascular flow voids are preserved.    Skull/extracranial contents (limited evaluation): Bone marrow signal intensity is normal.                                       CTA Head and Neck (xpd) (Final result)  Result time 04/19/23 15:56:32      Final result by Juan Antonio Marinelli MD (04/19/23 15:56:32)                   Impression:      CT head shows no acute intracranial abnormalities.    CTA head and neck shows no high-grade stenosis, large vessel occlusion or aneurysm.      Electronically signed by: Juan Antonio Marinelli MD  Date:    04/19/2023  Time:    15:56               Narrative:    EXAMINATION:  CTA HEAD AND NECK (XPD)    CLINICAL  HISTORY:  Transient ischemic attack (TIA);    TECHNIQUE:  Non contrast low dose axial images were obtained through the head.  CT angiogram was performed from the level of the valarie to the top of the head following the IV administration of 75mL of Omnipaque 350.   Sagittal and coronal reconstructions and maximum intensity projection reconstructions were performed. Arterial stenosis percentages are based on NASCET measurement criteria.    COMPARISON:  None    FINDINGS:  CT head: No midline shift, hydrocephalus or mass effect.  No acute intracranial hemorrhage or acute major vascular territory infarct.  No abnormal extra-axial fluid collections.  No abnormal intracranial enhancing lesions.    CTA head and neck:    Aortic arch: Aortic arch is normal caliber without significant atherosclerosis.  Major aortic branch vessels are patent with common origin of the innominate and left common carotid artery.    Bilateral common carotid arteries are patent.  No significant atherosclerosis or stenosis at the carotid bifurcations.  The cervical, petrous, cavernous and supraclinoid segments of the internal carotid arteries are patent.    The major anterior and middle cerebral artery branches are patent.    Distal vertebral, basilar and major posterior cerebral artery branches are patent.    Major dural venous sinuses are patent.    Nonvascular structures: Orbits show no significant abnormalities.  Parotid, submandibular and thyroid gland shows no significant abnormalities.  Visualized soft tissue structures in the neck show no significant abnormalities.  Mild mucosal membrane thickening in the paranasal sinuses.  Mastoid air cells are clear.  Osseous structures show no acute abnormalities.  Visualized portions of the lung apices show no significant abnormalities.                                       Medications   lactated ringers bolus 1,000 mL (0 mLs Intravenous Stopped 4/19/23 1133)   metoclopramide HCl injection 10 mg (10 mg  Intravenous Given 23 1342)   acetaminophen tablet 650 mg (650 mg Oral Given 23 1342)   iohexoL (OMNIPAQUE 350) injection 75 mL (75 mLs Intravenous Given 23 1510)     Medical Decision Making:   History:   Old Medical Records: I decided to obtain old medical records.  Initial Assessment:   43-year-old female presents emergency department for headache and right arm paresthesia.  Differential Diagnosis:   CVA, complex migraine, meningitis  Clinical Tests:   Lab Tests: Ordered and Reviewed  Radiological Study: Ordered and Reviewed  Medical Tests: Ordered and Reviewed  ED Management:  Patient reports posterior headache with right arm paresthesia.  Denies any weakness and no focal deficits on exam.  She is van negative.  Not feel she warrants stroke activation at this time.  Given her neurological complaints CTA head and neck was obtained which did not show any abnormalities and a subsequent MRI of the brain did not show any evidence of acute CVA.  She did receive a migraine cocktail with resolution of her symptoms.  No neck stiffness or meningismus on exam.  Will discharge home with outpatient neurology follow-up.  Discussed return to ED precautions for any new or worsening symptoms.          Attending Attestation:     Physician Attestation Statement for NP/PA:   I have conducted a face to face encounter with this patient in addition to the NP/PA, due to Medical Complexity    Other NP/PA Attestation Additions:      Medical Decision Makin yo F with headache and RUE paresthesia. AAA x 3, No neuro deficit on exam, no meningeal signs    Migraine > CVA. Will obtain CTA head and neck, if neg, will obtain MRI. No code stroke activation                        Clinical Impression:   Final diagnoses:  [R51.9] Headache        ED Disposition Condition    Discharge Stable          ED Prescriptions    None       Follow-up Information       Follow up With Specialties Details Why Contact Info Additional Information     [facility]rology Schedule an appointment as soon as possible for a visit   [address]  [facility] [address] [phone] [facility], 7th Floor Please park in [facility] and take Clinic elevator             Irma Pena PA-C  04/19/23 2057       Luisa Juan MD  04/19/23 2103

## 2023-04-20 ENCOUNTER — PATIENT MESSAGE (OUTPATIENT)
Dept: NEUROLOGY | Facility: CLINIC | Age: 43
End: 2023-04-20
Payer: COMMERCIAL

## 2023-04-22 ENCOUNTER — NURSE TRIAGE (OUTPATIENT)
Dept: ADMINISTRATIVE | Facility: CLINIC | Age: 43
End: 2023-04-22
Payer: COMMERCIAL

## 2023-04-22 ENCOUNTER — PATIENT MESSAGE (OUTPATIENT)
Dept: ENDOSCOPY | Facility: HOSPITAL | Age: 43
End: 2023-04-22
Payer: COMMERCIAL

## 2023-04-22 NOTE — TELEPHONE ENCOUNTER
"[place]    PCP:  Dr. Meryl Hall    Pt transferred from front end for:    Symptom: Headache  Outcome: Transfer to a nurse or provider NOW!  Reason: Stiff neck    She reports was seen in ED on Wed and was diagnosed with complex migraines.  She reports that she wants to know if they will put her under for the Colonoscopy on Mon.  C/O migraine, intermittent dizziness, and stiff neck.  She is able to touch her chin to her chest.  Pt states neck is sore from sitting up in chair in the ED for long period of time.  Per protocol, care advised is go to [facility]/ED now.  Pt refuses care advised.  Care advice reinforced but pt continues to refuse care advised.  Pt states that she has been seen for these symptoms at the ED and the symptoms are the same.  She just needs to know what to do about the Colonoscopy.  NT contacted OCP, Dr. Cornoado.  OCP recommends that she reschedule the Colonoscopy for another time if she is not feeling well.  Pt contacted and notified of OCP recommendations.  Pt VU.  Advised to call for worsening/questions/concerns.  VU.     Reason for Disposition   [1] Caller has URGENT question or concern AND [2] triager unable to answer question   [1] SEVERE headache (e.g., excruciating) AND [2] "worst headache" of life    Additional Information   Negative: Shock suspected (e.g., cold/pale/clammy skin, too weak to stand, low BP, rapid pulse)   Negative: Difficult to awaken or acting confused (e.g., disoriented, slurred speech)   Negative: Passed out (i.e., lost consciousness, collapsed and was not responding)   Negative: Sounds like a life-threatening emergency to the triager   Negative: SEVERE abdomen pain (e.g., excruciating)   Negative: SEVERE rectal bleeding (large blood clots; on and off, or constant bleeding)   Negative: SEVERE dizziness (e.g., unable to stand, requires support to walk, feels like passing out now)   Negative: SEVERE vomiting (e.g., 6 or more times/day)   Negative: [1] MODERATE rectal bleeding " (small blood clots, passing blood without stool, or toilet water turns red) AND [2] more than once   Negative: [1] MILD-MODERATE abdomen pain AND [2] constant AND [3] present > 2 hours   Negative: [1] Drinking very little AND [2] dehydration suspected (e.g., no urine > 12 hours, very dry mouth, very lightheaded)   Negative: Patient sounds very sick or weak to the triager   Negative: Fever > 100.4 F (38.0 C)   Negative: [1] Abdominal bloating, cramping, nausea, or vomiting while drinking bowel prep AND [2] CANNOT finish bowel prep AND [3] has tried recommended Care Advice   Negative: Difficult to awaken or acting confused (e.g., disoriented, slurred speech)   Negative: [1] Weakness of the face, arm or leg on one side of the body AND [2] new-onset   Negative: [1] Numbness of the face, arm or leg on one side of the body AND [2] new-onset   Negative: [1] Loss of speech or garbled speech AND [2] new-onset   Negative: Passed out (i.e., lost consciousness, collapsed and was not responding)   Negative: Sounds like a life-threatening emergency to the triager   Negative: Unable to walk, or can only walk with assistance (e.g., requires support)   Negative: Stiff neck (can't touch chin to chest)   Negative: Severe pain in one eye   Negative: [1] Other family members (or roommates) with headaches AND [2] possibility of carbon monoxide exposure    Protocols used: Colonoscopy Symptoms and [id number], Headache-A-AH

## 2023-04-25 ENCOUNTER — TELEPHONE (OUTPATIENT)
Dept: ENDOSCOPY | Facility: HOSPITAL | Age: 43
End: 2023-04-25
Payer: COMMERCIAL

## 2023-04-25 NOTE — TELEPHONE ENCOUNTER
Called to reschedule colonoscopy, pt would like to see her neurologist and get clearance and schedule her colonoscopy later in the year. Gave pt scheduling number to call and schedule when ready.

## 2023-04-26 ENCOUNTER — OFFICE VISIT (OUTPATIENT)
Dept: NEUROLOGY | Facility: CLINIC | Age: 43
End: 2023-04-26
Payer: COMMERCIAL

## 2023-04-26 VITALS
WEIGHT: 95 LBS | SYSTOLIC BLOOD PRESSURE: 113 MMHG | HEART RATE: 116 BPM | BODY MASS INDEX: 17.94 KG/M2 | HEIGHT: 61 IN | DIASTOLIC BLOOD PRESSURE: 79 MMHG

## 2023-04-26 DIAGNOSIS — F51.01 PRIMARY INSOMNIA: ICD-10-CM

## 2023-04-26 DIAGNOSIS — F41.1 GENERALIZED ANXIETY DISORDER: ICD-10-CM

## 2023-04-26 DIAGNOSIS — G43.111 INTRACTABLE MIGRAINE WITH AURA WITH STATUS MIGRAINOSUS: Primary | ICD-10-CM

## 2023-04-26 PROCEDURE — 99999 PR PBB SHADOW E&M-EST. PATIENT-LVL IV: ICD-10-PCS | Mod: PBBFAC,,, | Performed by: PHYSICIAN ASSISTANT

## 2023-04-26 PROCEDURE — 1160F PR REVIEW ALL MEDS BY PRESCRIBER/CLIN PHARMACIST DOCUMENTED: ICD-10-PCS | Mod: CPTII,S$GLB,, | Performed by: PHYSICIAN ASSISTANT

## 2023-04-26 PROCEDURE — 1160F RVW MEDS BY RX/DR IN RCRD: CPT | Mod: CPTII,S$GLB,, | Performed by: PHYSICIAN ASSISTANT

## 2023-04-26 PROCEDURE — 3074F PR MOST RECENT SYSTOLIC BLOOD PRESSURE < 130 MM HG: ICD-10-PCS | Mod: CPTII,S$GLB,, | Performed by: PHYSICIAN ASSISTANT

## 2023-04-26 PROCEDURE — 3008F PR BODY MASS INDEX (BMI) DOCUMENTED: ICD-10-PCS | Mod: CPTII,S$GLB,, | Performed by: PHYSICIAN ASSISTANT

## 2023-04-26 PROCEDURE — 1159F PR MEDICATION LIST DOCUMENTED IN MEDICAL RECORD: ICD-10-PCS | Mod: CPTII,S$GLB,, | Performed by: PHYSICIAN ASSISTANT

## 2023-04-26 PROCEDURE — 99205 PR OFFICE/OUTPT VISIT, NEW, LEVL V, 60-74 MIN: ICD-10-PCS | Mod: S$GLB,,, | Performed by: PHYSICIAN ASSISTANT

## 2023-04-26 PROCEDURE — 3074F SYST BP LT 130 MM HG: CPT | Mod: CPTII,S$GLB,, | Performed by: PHYSICIAN ASSISTANT

## 2023-04-26 PROCEDURE — 3078F DIAST BP <80 MM HG: CPT | Mod: CPTII,S$GLB,, | Performed by: PHYSICIAN ASSISTANT

## 2023-04-26 PROCEDURE — 3008F BODY MASS INDEX DOCD: CPT | Mod: CPTII,S$GLB,, | Performed by: PHYSICIAN ASSISTANT

## 2023-04-26 PROCEDURE — 1159F MED LIST DOCD IN RCRD: CPT | Mod: CPTII,S$GLB,, | Performed by: PHYSICIAN ASSISTANT

## 2023-04-26 PROCEDURE — 99205 OFFICE O/P NEW HI 60 MIN: CPT | Mod: S$GLB,,, | Performed by: PHYSICIAN ASSISTANT

## 2023-04-26 PROCEDURE — 99999 PR PBB SHADOW E&M-EST. PATIENT-LVL IV: CPT | Mod: PBBFAC,,, | Performed by: PHYSICIAN ASSISTANT

## 2023-04-26 PROCEDURE — 3078F PR MOST RECENT DIASTOLIC BLOOD PRESSURE < 80 MM HG: ICD-10-PCS | Mod: CPTII,S$GLB,, | Performed by: PHYSICIAN ASSISTANT

## 2023-04-26 RX ORDER — LORAZEPAM 0.5 MG/1
0.5 TABLET ORAL EVERY 6 HOURS PRN
COMMUNITY
End: 2023-11-14

## 2023-04-26 RX ORDER — METHYLPREDNISOLONE 4 MG/1
TABLET ORAL
Qty: 1 EACH | Refills: 0 | Status: SHIPPED | OUTPATIENT
Start: 2023-04-26 | End: 2024-01-08

## 2023-04-26 RX ORDER — RIZATRIPTAN BENZOATE 5 MG/1
TABLET, ORALLY DISINTEGRATING ORAL
Qty: 12 TABLET | Refills: 3 | Status: SHIPPED | OUTPATIENT
Start: 2023-04-26

## 2023-04-26 NOTE — PROGRESS NOTES
"New Patient     SUBJECTIVE:  Patient ID: Tobi Dennies   MRN: [id number]  Referred By: Aaareferral Self  Chief Complaint: Headache      History of Present Illness:   43 y.o. female with ha's, asthma, anxiety, ADHD, insomnia, seasonal allergies, gestational DM, who presents to clinic alone for evaluation of headaches.   PMHx negative for TBI, Meningitis, Aneurysms, Kidney Stones, GI bleed, osteoporosis, CAD/MI, CVA/TIA, DM, cancer  Family Hx - adopted    Pt states she has a h/o HA's "since always"  around late teens triggered by menstrual cycle, allergies, smells, or MSG w/ associated aura's of shadow in peripehral vision or floaters. A couple of weeks ago, she ate MSG x 3 days, had increased stress, and decreased sleep. She recalls on Wednesday she "didn't feel right". She began experiencing a HA, then experienced right arm tingling, then lightheadedness. She was seen in the ED where a CTA and MRI brain were wnl. Her symptoms improved and tingling resolved w/ a migraine cocktail and was advised to f/up outpatient. Since that time, she has continued to have a HA but it is much milder (5/10), "still feels off, like rafal drunk, my heads not clear" best described as slowed cognition and mental exhaustion. Feels her speech is slowed. No abnormalities noted in visit, no stuttering or slurred speech, speech is fluent. Pain is described as a b/l occipitalis pressure, throbbing, tight pain radiating forward to b/l temples and jaw, bl/ temples, neck and shoulders feel sore. With associated photophobia and phonophobia. She has tried excedrin, advil, and tylenol. Pain is worst when she is at work. She denies all other ROS.     Treatments Tried   Bb - avoid 2/2 asthma hx  Advil  Tylenol  excedrin    Social History  Alcohol - weekends  Smoke - denies  Recreational Drug Use- denies  Occupation - , computer based  Last eye exam - 1 yr ago    Current Medications:    Current Outpatient Medications:     albuterol (VENTOLIN HFA) " "90 mcg/actuation inhaler, Inhale 2 puffs into the lungs every 6 (six) hours as needed for Wheezing. Rescue, Disp: 18 g, Rfl: 3    [START ON 4/28/2023] busPIRone (BUSPAR) 15 MG tablet, Take 1 tablet (15 mg total) by mouth 2 (two) times a day., Disp: 60 tablet, Rfl: 2    levonorgestreL (MIRENA) 20 mcg/24 hours (6 yrs) 52 mg IUD, 1 each by Intrauterine route once., Disp: , Rfl:     lisdexamfetamine (VYVANSE) 30 MG capsule, Take 1 capsule (30 mg total) by mouth every morning., Disp: 30 capsule, Rfl: 0    LORazepam (ATIVAN) 0.5 MG tablet, Take 0.5 mg by mouth every 6 (six) hours as needed for Anxiety., Disp: , Rfl:     multivitamin (THERAGRAN) per tablet, Take 1 tablet by mouth once daily., Disp: , Rfl:     methylPREDNISolone (MEDROL DOSEPACK) 4 mg tablet, use as directed, Disp: 1 each, Rfl: 0    rizatriptan (MAXALT-MLT) 5 MG disintegrating tablet, Take at onset of migraine, can repeat in 2 hrs if needed.  No more than 2 tabs per day or 3 days/wk., Disp: 12 tablet, Rfl: 3    traZODone (DESYREL) 50 MG tablet, Take 1 tablet (50 mg total) by mouth nightly as needed for Insomnia., Disp: 30 tablet, Rfl: 11    Review of Systems - as per HPI, otherwise a balanced 10 systems review is negative.    OBJECTIVE:  Vitals:  /79   Pulse (!) 116   Ht 5' 1" (1.549 m)   Wt 43.1 kg (95 lb)   BMI 17.95 kg/m²     Physical Exam   Constitutional: she appears well-developed and well-nourished. she is well groomed. NAD  HENT:    Head: Normocephalic and atraumatic, Frontalis was TTP, temporalis was TTP   Eyes: Conjunctivae and EOM are normal. Pupils are equal, round, and reactive to light   Neck: Neck supple. Occiput and trapezius TTP, traps tight   Musculoskeletal: Normal range of motion. No joint stiffness. No vertebral point tenderness.  Skin: Skin is warm and dry.  Psychiatric: Normal mood and affect.     Neuro exam:    Mental status:  The patient is alert and oriented to person, place and time.  Language is intact and " fluent  Remote and recent memory are intact  Normal attention and concentration  Mood is stable    Cranial Nerves:  Pupils are equal and reactive to light.    Extraocular movements are intact and without nystagmus.    Visual fields are full to confrontation testing.   Facial movement is symmetric.  Facial sensation is intact.    Hearing is intact   FROM of neck in all (6) directions without pain  Shoulder shrug symmetrical.    Coordination:     Finger to nose - normal and symmetric bilaterally     Motor:  Normal muscle bulk and symmetry. No fasciculations were noted.   Tremor not apparent   Pronator drift not apparent.    strength was strong and symmetric  RUE:appropriate against gravity and medium force as tested 5/5  LUE: appropriate against gravity and medium force as tested 5/5  RLE:appropriate against gravity and medium force as tested 5/5              LLE: appropriate against gravity and medium force as tested 5/5    Sensory:  RUE  intact light touch  LUE intact light touch  RLE intact light touch  LLE intact light touch    Gait:   Normal gait    Review of Data:   Notes from ED reviewed   Labs:  Admission on 04/19/2023, Discharged on 04/19/2023   Component Date Value Ref Range Status    POC Preg Test, Ur 04/19/2023 Negative  Negative Final     Acceptable 04/19/2023 Yes   Final    WBC 04/19/2023 7.65  3.90 - 12.70 K/uL Final    RBC 04/19/2023 4.65  4.00 - 5.40 M/uL Final    Hemoglobin 04/19/2023 14.1  12.0 - 16.0 g/dL Final    Hematocrit 04/19/2023 41.6  37.0 - 48.5 % Final    MCV 04/19/2023 90  82 - 98 fL Final    MCH 04/19/2023 30.3  27.0 - 31.0 pg Final    MCHC 04/19/2023 33.9  32.0 - 36.0 g/dL Final    RDW 04/19/2023 12.1  11.5 - 14.5 % Final    Platelets 04/19/2023 340  150 - 450 K/uL Final    MPV 04/19/2023 9.2  9.2 - 12.9 fL Final    Immature Granulocytes 04/19/2023 0.4  0.0 - 0.5 % Final    Gran # (ANC) 04/19/2023 5.1  1.8 - 7.7 K/uL Final    Immature Grans (Abs) 04/19/2023 0.03  0.00  - 0.04 K/uL Final    Lymph # 04/19/2023 2.0  1.0 - 4.8 K/uL Final    Mono # 04/19/2023 0.4  0.3 - 1.0 K/uL Final    Eos # 04/19/2023 0.0  0.0 - 0.5 K/uL Final    Baso # 04/19/2023 0.06  0.00 - 0.20 K/uL Final    nRBC 04/19/2023 0  0 /100 WBC Final    Gran % 04/19/2023 66.2  38.0 - 73.0 % Final    Lymph % 04/19/2023 26.7  18.0 - 48.0 % Final    Mono % 04/19/2023 5.4  4.0 - 15.0 % Final    Eosinophil % 04/19/2023 0.5  0.0 - 8.0 % Final    Basophil % 04/19/2023 0.8  0.0 - 1.9 % Final    Differential Method 04/19/2023 Automated   Final    Sodium 04/19/2023 140  136 - 145 mmol/L Final    Potassium 04/19/2023 3.9  3.5 - 5.1 mmol/L Final    Chloride 04/19/2023 108  95 - 110 mmol/L Final    CO2 04/19/2023 23  23 - 29 mmol/L Final    Glucose 04/19/2023 91  70 - 110 mg/dL Final    BUN 04/19/2023 6  6 - 20 mg/dL Final    Creatinine 04/19/2023 0.8  0.5 - 1.4 mg/dL Final    Calcium 04/19/2023 9.6  8.7 - 10.5 mg/dL Final    Total Protein 04/19/2023 7.8  6.0 - 8.4 g/dL Final    Albumin 04/19/2023 4.8  3.5 - 5.2 g/dL Final    Total Bilirubin 04/19/2023 0.7  0.1 - 1.0 mg/dL Final    Alkaline Phosphatase 04/19/2023 62  55 - 135 U/L Final    AST 04/19/2023 14  10 - 40 U/L Final    ALT 04/19/2023 11  10 - 44 U/L Final    Anion Gap 04/19/2023 9  8 - 16 mmol/L Final    eGFR 04/19/2023 >60.0  >60 mL/min/1.73 m^2 Final    SARS-CoV-2 RNA, Amplification, Qual 04/19/2023 Negative  Negative Final    aPTT 04/19/2023 27.8  21.0 - 32.0 sec Final    TSH 04/19/2023 1.940  0.400 - 4.000 uIU/mL Final    Prothrombin Time 04/19/2023 10.5  9.0 - 12.5 sec Final    INR 04/19/2023 1.0  0.8 - 1.2 Final    POC Glucose 04/19/2023 90  70 - 110 mg/dL Final    POC BUN 04/19/2023 5 (L)  6 - 30 mg/dL Final    POC Creatinine 04/19/2023 0.7  0.5 - 1.4 mg/dL Final    POC Sodium 04/19/2023 141  136 - 145 mmol/L Final    POC Potassium 04/19/2023 3.9  3.5 - 5.1 mmol/L Final    POC Chloride 04/19/2023 105  95 - 110 mmol/L Final    POC TCO2 (MEASURED) 04/19/2023 22 (L)   23 - 29 mmol/L Final    POC Ionized Calcium 04/19/2023 1.16  1.06 - 1.42 mmol/L Final    POC Hematocrit 04/19/2023 43  36 - 54 %PCV Final    Sample 04/19/2023 TANNER   Final     Imaging:  Results for orders placed or performed during the hospital encounter of 04/19/23   MRI Brain Without Contrast    Narrative    EXAMINATION:  MRI BRAIN WITHOUT CONTRAST    CLINICAL HISTORY:  Transient ischemic attack (TIA);Headache, new or worsening, neuro deficit (Age 19-49y);.    TECHNIQUE:  Multiplanar multisequence MR imaging of the brain was performed without contrast.    COMPARISON:  CTA head neck 04/19/2023.    FINDINGS:  Intracranial compartment:    Ventricles and sulci are normal in size for age without evidence of hydrocephalus. No extra-axial blood or fluid collections.    The brain parenchyma appears normal. No mass lesion, acute hemorrhage, edema or acute infarct.    Normal vascular flow voids are preserved.    Skull/extracranial contents (limited evaluation): Bone marrow signal intensity is normal.      Impression    No acute abnormality.      Electronically signed by: Ricki Winter MD  Date:    04/19/2023  Time:    20:46     Note: I have independently reviewed any/all imaging/labs/tests and agree with the report (s) as documented.  Any discrepancies will be as noted/demarcated by free text.  ELLEN DUENAS 4/26/2023    ASSESSMENT:  1. Intractable migraine with aura with status migrainosus    2. Generalized anxiety disorder    3. Primary insomnia          PLAN:  - Discussed symptoms appear to be consistent with migraine w/ aura complicated by poor sleep and stress, discussed treatment options and patient agreed with the following plan:  - start supplements  - abortive - start maxalt prn  - medrol dose pack to break current cycle  - stress/anxiety - continue mgmt per psychiatrist  - muscle tension - discussed conservative measures  - sleep - sleep hygiene techniques discussed  - risks, benefits, and potential side effects of  maxalt, elavil, effexor discussed   - alternative treatment options offered   - importance of healthy diet, regular exercise and sleep hygiene in the treatment of headaches    - Start tracking headaches via Migraine Buddy gloria on phone   - RTC in 1-3 mo     Orders Placed This Encounter    rizatriptan (MAXALT-MLT) 5 MG disintegrating tablet    methylPREDNISolone (MEDROL DOSEPACK) 4 mg tablet       I have discussed realistic goals of care with patient at length as well as medication options, and need for lifestyle adjustment. I have explained that treatment will take time. We have agreed that the goal will be to reduce frequency/intensity/quantity of HA, not to be completely HA free. I have explained my non narcotic policy regarding headache treatment.    Patient agreeable to work on lifestyle adjustments.    Discussed potential for teratogenicity with treatment, patient understands if her family planning status should change she will contact office immediately and we will safely adjust medications as needed.     Questions and concerns were sought and answered to the patient's stated verbal satisfaction.  The patient verbalizes understanding and agreement with the above stated treatment plan.     CC: MD Daria Torrez PA-C  [facility]  [phone]    Dr. Julio was available during today's encounter.     I spent 60 minutes on the day of this encounter preparing for, treating and managing this patient presenting with headaches.

## 2023-04-26 NOTE — PATIENT INSTRUCTIONS
Supplements for Migraine:  1. Magnesium Oxide - 400mg by mouth daily  2. Riboflavin (Vitamin B2) - 400mg by mouth daily  3. Coenzyme Q10 - 200mg tablet by mouth daily    - Please download Migraine Marcos gloria on phone and begin tracking your headaches

## 2023-05-16 ENCOUNTER — PATIENT MESSAGE (OUTPATIENT)
Dept: PSYCHIATRY | Facility: CLINIC | Age: 43
End: 2023-05-16
Payer: COMMERCIAL

## 2023-05-16 DIAGNOSIS — F90.2 ADHD (ATTENTION DEFICIT HYPERACTIVITY DISORDER), COMBINED TYPE: ICD-10-CM

## 2023-05-16 RX ORDER — LISDEXAMFETAMINE DIMESYLATE CAPSULES 20 MG/1
20 CAPSULE ORAL EVERY MORNING
Qty: 30 CAPSULE | Refills: 0 | Status: SHIPPED | OUTPATIENT
Start: 2023-05-16 | End: 2023-06-26 | Stop reason: SDUPTHER

## 2023-06-26 DIAGNOSIS — F90.2 ADHD (ATTENTION DEFICIT HYPERACTIVITY DISORDER), COMBINED TYPE: ICD-10-CM

## 2023-06-27 RX ORDER — LISDEXAMFETAMINE DIMESYLATE CAPSULES 20 MG/1
20 CAPSULE ORAL EVERY MORNING
Qty: 30 CAPSULE | Refills: 0 | Status: SHIPPED | OUTPATIENT
Start: 2023-06-27 | End: 2023-07-13 | Stop reason: SDUPTHER

## 2023-07-13 ENCOUNTER — OFFICE VISIT (OUTPATIENT)
Dept: PSYCHIATRY | Facility: CLINIC | Age: 43
End: 2023-07-13
Payer: COMMERCIAL

## 2023-07-13 ENCOUNTER — PATIENT MESSAGE (OUTPATIENT)
Dept: PSYCHIATRY | Facility: CLINIC | Age: 43
End: 2023-07-13

## 2023-07-13 DIAGNOSIS — F41.1 GENERALIZED ANXIETY DISORDER: ICD-10-CM

## 2023-07-13 DIAGNOSIS — F90.2 ADHD (ATTENTION DEFICIT HYPERACTIVITY DISORDER), COMBINED TYPE: Primary | ICD-10-CM

## 2023-07-13 PROCEDURE — 99214 PR OFFICE/OUTPT VISIT, EST, LEVL IV, 30-39 MIN: ICD-10-PCS | Mod: 95,,, | Performed by: NURSE PRACTITIONER

## 2023-07-13 PROCEDURE — 99214 OFFICE O/P EST MOD 30 MIN: CPT | Mod: 95,,, | Performed by: NURSE PRACTITIONER

## 2023-07-13 RX ORDER — LISDEXAMFETAMINE DIMESYLATE CAPSULES 20 MG/1
20 CAPSULE ORAL EVERY MORNING
Qty: 30 CAPSULE | Refills: 0 | Status: SHIPPED | OUTPATIENT
Start: 2023-07-28 | End: 2023-08-27

## 2023-07-13 RX ORDER — LISDEXAMFETAMINE DIMESYLATE CAPSULES 20 MG/1
20 CAPSULE ORAL EVERY MORNING
Qty: 30 CAPSULE | Refills: 0 | Status: SHIPPED | OUTPATIENT
Start: 2023-08-27 | End: 2023-09-13

## 2023-07-13 RX ORDER — LISDEXAMFETAMINE DIMESYLATE CAPSULES 20 MG/1
20 CAPSULE ORAL EVERY MORNING
Qty: 30 CAPSULE | Refills: 0 | Status: SHIPPED | OUTPATIENT
Start: 2023-09-26 | End: 2023-09-13

## 2023-07-13 RX ORDER — BUSPIRONE HYDROCHLORIDE 15 MG/1
15 TABLET ORAL 2 TIMES DAILY
Qty: 60 TABLET | Refills: 3 | Status: SHIPPED | OUTPATIENT
Start: 2023-07-13 | End: 2023-11-14 | Stop reason: SDUPTHER
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
## 2023-08-07 DIAGNOSIS — F90.2 ADHD (ATTENTION DEFICIT HYPERACTIVITY DISORDER), COMBINED TYPE: Primary | ICD-10-CM

## 2023-08-07 RX ORDER — DEXTROAMPHETAMINE SACCHARATE, AMPHETAMINE ASPARTATE MONOHYDRATE, DEXTROAMPHETAMINE SULFATE AND AMPHETAMINE SULFATE 2.5; 2.5; 2.5; 2.5 MG/1; MG/1; MG/1; MG/1
10 CAPSULE, EXTENDED RELEASE ORAL EVERY MORNING
Qty: 30 CAPSULE | Refills: 0 | Status: SHIPPED | OUTPATIENT
Start: 2023-08-07 | End: 2023-09-13 | Stop reason: SDUPTHER

## 2023-08-23 ENCOUNTER — PATIENT MESSAGE (OUTPATIENT)
Dept: PRIMARY CARE CLINIC | Facility: CLINIC | Age: 43
End: 2023-08-23
Payer: COMMERCIAL

## 2023-09-13 DIAGNOSIS — F90.2 ADHD (ATTENTION DEFICIT HYPERACTIVITY DISORDER), COMBINED TYPE: ICD-10-CM

## 2023-09-13 RX ORDER — DEXTROAMPHETAMINE SACCHARATE, AMPHETAMINE ASPARTATE MONOHYDRATE, DEXTROAMPHETAMINE SULFATE AND AMPHETAMINE SULFATE 2.5; 2.5; 2.5; 2.5 MG/1; MG/1; MG/1; MG/1
10 CAPSULE, EXTENDED RELEASE ORAL EVERY MORNING
Qty: 30 CAPSULE | Refills: 0 | Status: SHIPPED | OUTPATIENT
Start: 2023-09-13 | End: 2023-10-18 | Stop reason: SDUPTHER

## 2023-10-18 DIAGNOSIS — F90.2 ADHD (ATTENTION DEFICIT HYPERACTIVITY DISORDER), COMBINED TYPE: ICD-10-CM

## 2023-10-20 RX ORDER — DEXTROAMPHETAMINE SACCHARATE, AMPHETAMINE ASPARTATE MONOHYDRATE, DEXTROAMPHETAMINE SULFATE AND AMPHETAMINE SULFATE 2.5; 2.5; 2.5; 2.5 MG/1; MG/1; MG/1; MG/1
10 CAPSULE, EXTENDED RELEASE ORAL EVERY MORNING
Qty: 30 CAPSULE | Refills: 0 | Status: SHIPPED | OUTPATIENT
Start: 2023-10-20 | End: 2023-11-14 | Stop reason: SDUPTHER

## 2023-11-14 ENCOUNTER — OFFICE VISIT (OUTPATIENT)
Dept: PSYCHIATRY | Facility: CLINIC | Age: 43
End: 2023-11-14
Payer: COMMERCIAL

## 2023-11-14 ENCOUNTER — PATIENT MESSAGE (OUTPATIENT)
Dept: PSYCHIATRY | Facility: CLINIC | Age: 43
End: 2023-11-14

## 2023-11-14 DIAGNOSIS — F32.A DEPRESSION, UNSPECIFIED DEPRESSION TYPE: ICD-10-CM

## 2023-11-14 DIAGNOSIS — F90.2 ADHD (ATTENTION DEFICIT HYPERACTIVITY DISORDER), COMBINED TYPE: Primary | ICD-10-CM

## 2023-11-14 DIAGNOSIS — F41.1 GENERALIZED ANXIETY DISORDER: ICD-10-CM

## 2023-11-14 PROCEDURE — 99214 OFFICE O/P EST MOD 30 MIN: CPT | Mod: 95,,, | Performed by: NURSE PRACTITIONER

## 2023-11-14 PROCEDURE — 99214 PR OFFICE/OUTPT VISIT, EST, LEVL IV, 30-39 MIN: ICD-10-PCS | Mod: 95,,, | Performed by: NURSE PRACTITIONER

## 2023-11-14 RX ORDER — BUSPIRONE HYDROCHLORIDE 15 MG/1
15 TABLET ORAL 2 TIMES DAILY
Qty: 60 TABLET | Refills: 3 | Status: SHIPPED | OUTPATIENT
Start: 2023-11-14 | End: 2024-02-21 | Stop reason: SDUPTHER

## 2023-11-14 RX ORDER — DEXTROAMPHETAMINE SACCHARATE, AMPHETAMINE ASPARTATE MONOHYDRATE, DEXTROAMPHETAMINE SULFATE AND AMPHETAMINE SULFATE 2.5; 2.5; 2.5; 2.5 MG/1; MG/1; MG/1; MG/1
10 CAPSULE, EXTENDED RELEASE ORAL EVERY MORNING
Qty: 30 CAPSULE | Refills: 0 | Status: SHIPPED | OUTPATIENT
Start: 2024-01-19 | End: 2024-02-21 | Stop reason: SDUPTHER

## 2023-11-14 RX ORDER — DEXTROAMPHETAMINE SACCHARATE, AMPHETAMINE ASPARTATE MONOHYDRATE, DEXTROAMPHETAMINE SULFATE AND AMPHETAMINE SULFATE 2.5; 2.5; 2.5; 2.5 MG/1; MG/1; MG/1; MG/1
10 CAPSULE, EXTENDED RELEASE ORAL EVERY MORNING
Qty: 30 CAPSULE | Refills: 0 | Status: SHIPPED | OUTPATIENT
Start: 2023-12-20 | End: 2024-01-08

## 2023-11-14 RX ORDER — DEXTROAMPHETAMINE SACCHARATE, AMPHETAMINE ASPARTATE MONOHYDRATE, DEXTROAMPHETAMINE SULFATE AND AMPHETAMINE SULFATE 2.5; 2.5; 2.5; 2.5 MG/1; MG/1; MG/1; MG/1
10 CAPSULE, EXTENDED RELEASE ORAL EVERY MORNING
Qty: 30 CAPSULE | Refills: 0 | Status: SHIPPED | OUTPATIENT
Start: 2023-11-20 | End: 2023-12-20

## 2023-11-14 RX ORDER — BUPROPION HYDROCHLORIDE 150 MG/1
150 TABLET ORAL DAILY
Qty: 30 TABLET | Refills: 2 | Status: SHIPPED | OUTPATIENT
Start: 2023-11-14 | End: 2024-02-21 | Stop reason: SDUPTHER

## 2023-11-14 NOTE — PROGRESS NOTES
"Outpatient Psychiatry Follow-Up Visit (MD/NP)    11/14/2023    Clinical Status of Patient:  Outpatient (Ambulatory)    Chief Complaint:  Tobi Dennies is a 43 y.o. female who presents today for follow-up of anxiety and attention problems.  Met with patient.     The patient location is: [place]   The chief complaint leading to consultation is: ADHD, anxiety    Visit type: audiovisual    Face-to-face time spent: 19 minutes  27 minutes total time spent. This includes face to face time and non-face to face time preparing to see the patient (eg, review of tests), obtaining and/or reviewing separately obtained history, documenting clinical information in the electronic or other health record, independently interpreting results and communicating results to the patient/family/caregiver, or care coordinator.     Each patient to whom he or she provides medical services by telemedicine is:  (1) informed of the relationship between the physician and patient and the respective role of any other health care provider with respect to management of the patient; and (2) notified that he or she may decline to receive medical services by telemedicine and may withdraw from such care at any time.    Notes:       Interval History and Content of Current Session:    Social/medical updates -- no major social changes. Daughter continues to live with her.     "Things that I want to do I kind of need that extra push."    Mood -- unsure if she is depressed, though has noticed that she has difficulty motivating herself to do things, even activities she enjoys. Once "forced" to do these things she can enjoy them. Watches TV however does not really concentrate on it, feels more fatigued than usual on days where she does not take Adderall. Patient wonders whether there is a seasonal pattern to her sx, noticed change in mood around time change for daylight savings.  Anxiety -- "I feel like the buspar works." Finds that overall he anxiety has been " "manageable lately. No persistent unregulated worry or panic. No panic attacks, "I wait for it to come but it never does."  Attention -- adequate, finds Adderall XR effective, unable to fill Vyvanse due to cost   Psychosis -- no  Sleep -- largely adequate  Energy -- adequate though does feel more fatigued on days when not taking Adderall   Appetite -- no change from baseline,     Substance use -- sporadic ETOH, 1x monthly socially.     Medications:    Adderall XR 10 mg daily -- takes daily, denies SE  Buspirone 15 mg BID -- takes BID, denies SE    Start: Bupropion  mg daily     Lorazepam 0.5 mg PRN -- rare demand, old script    OTC -- melatonin, multivitamins     Previous medications: lexapro, zoloft, ativan, trazodone      Brief synopsis:  Seasonal depression (?), no SI/HI/AVH    Review of Systems   PSYCHIATRIC: Pertinant items are noted in the narrative.  CONSTITUTIONAL: No weight gain or loss.   MUSCULOSKELETAL: No pain or stiffness of the joints.  NEUROLOGIC: No weakness, sensory changes, seizures, confusion, memory loss, tremor or other abnormal movements.  GASTROINTESTINAL: No nausea, vomiting, pain, constipation or diarrhea.    Past Medical, Family and Social History: The patient's past medical, family and social history have been reviewed and updated as appropriate within the electronic medical record - see encounter notes.    Compliance: see above    Side effects: see above    Risk Parameters:  Patient reports no suicidal ideation  Patient reports no homicidal ideation  Patient reports no self-injurious behavior  Patient reports no violent behavior    Exam (detailed: at least 9 elements; comprehensive: all 15 elements)   Constitutional  Vitals:  Most recent vital signs, dated greater than 90 days prior to this appointment, were not reviewed.   There were no vitals filed for this visit.       General:  unremarkable, age appropriate,     Musculoskeletal  Muscle Strength/Tone:  not examined   Gait & " Station:  QUENTIN     Psychiatric  Speech:  no latency; no press   Mood & Affect:  depressed  full   Thought Process:  normal and logical   Associations:  intact   Thought Content:  normal, no suicidality, no homicidality, delusions, or paranoia   Insight:  intact   Judgement: behavior is adequate to circumstances   Orientation:  grossly intact   Memory: intact for content of interview   Language: grossly intact   Attention Span & Concentration:  able to focus   Fund of Knowledge:  intact and appropriate to age and level of education     Assessment and Diagnosis   Status/Progress: Based on the examination today, the patient's problem(s) is/are adequately but not ideally controlled.  New problems have been presented today.   Co-morbidities, Diagnostic uncertainty, and Lack of compliance are not complicating management of the primary condition.  There are no active rule-out diagnoses for this patient at this time.     General Impression: Tobi Dennies is a 43 y.o. female with a psychiatric hx of LYLY and ADHD. Medical hx is largely benign. Family MH hx significant for anxiety. Patient with psychotropic treatment of anxiety with SSRIs with minimal effect. Started on Vyvanse in 10/2022 with positive effect on attention deficit and anxiety sx. No hx of psychiatric hospitalizations. No hx of suicide attempts, self-injurious behavior, or violence. No hx of substance abuse.     04/14/23 -- sx stable. BP/HR elevated, patient attributes to rushing to appointment from garage. BP significantly decreased upon recheck, HR tachycardic.    07/13/23 -- multiple stressors exacerbating anxiety. Overall appears to be coping effectively. Reports Vyvanse at lower dose of 20 mg is effective. No medication changes made.    11/14/23 -- presents with sx consistent with depression, possibly SAD. Start bupropion  mg daily. Continue Adderall XR and buspirone as prescribed.       ICD-10-CM ICD-9-CM   1. ADHD (attention deficit hyperactivity  disorder), combined type  F90.2 314.01   2. Generalized anxiety disorder  F41.1 300.02   3. Depression, unspecified depression type  F32.A 311       Intervention/Counseling/Treatment Plan   Reviewed patient's symptoms and medication regimen  Start bupropion XL targeting depressive sx  Given information on light therapy for seasonal depression   Given option of attempting light therapy prior to starting antidepressant  Continue Adderall XR and buspirone as prescribed     Medication Management  Prescription drug management was employed during the encounter, as medications were prescribed, or considered but not prescribed.   [facility] reviewed  The risks and benefits of medication were discussed with the patient.  Possible expectable adverse effects of any current or proposed individual psychotropic agents were discussed with this patient.  Counseling was provided on the importance of full compliance with any prescribed medication.  Detailed instructions were provided to the patient regarding the administration of any prescribed medication.  Patient voiced understanding    Return to Clinic: 3 months

## 2023-12-14 ENCOUNTER — OFFICE VISIT (OUTPATIENT)
Dept: OBSTETRICS AND GYNECOLOGY | Facility: CLINIC | Age: 43
End: 2023-12-14
Payer: COMMERCIAL

## 2023-12-14 VITALS
DIASTOLIC BLOOD PRESSURE: 70 MMHG | WEIGHT: 99.19 LBS | HEIGHT: 61 IN | SYSTOLIC BLOOD PRESSURE: 106 MMHG | BODY MASS INDEX: 18.73 KG/M2

## 2023-12-14 DIAGNOSIS — Z01.419 ENCOUNTER FOR ANNUAL ROUTINE GYNECOLOGICAL EXAMINATION: Primary | ICD-10-CM

## 2023-12-14 DIAGNOSIS — Z12.4 CERVICAL CANCER SCREENING: ICD-10-CM

## 2023-12-14 DIAGNOSIS — Z30.431 ENCOUNTER FOR ROUTINE CHECKING OF INTRAUTERINE CONTRACEPTIVE DEVICE (IUD): ICD-10-CM

## 2023-12-14 DIAGNOSIS — N60.02 BENIGN BREAST CYST IN FEMALE, LEFT: ICD-10-CM

## 2023-12-14 DIAGNOSIS — Z12.11 COLON CANCER SCREENING: ICD-10-CM

## 2023-12-14 DIAGNOSIS — Z12.31 BREAST CANCER SCREENING BY MAMMOGRAM: ICD-10-CM

## 2023-12-14 PROCEDURE — 99396 PR PREVENTIVE VISIT,EST,40-64: ICD-10-PCS | Mod: S$GLB,,, | Performed by: OBSTETRICS & GYNECOLOGY

## 2023-12-14 PROCEDURE — 3078F PR MOST RECENT DIASTOLIC BLOOD PRESSURE < 80 MM HG: ICD-10-PCS | Mod: CPTII,S$GLB,, | Performed by: OBSTETRICS & GYNECOLOGY

## 2023-12-14 PROCEDURE — 3078F DIAST BP <80 MM HG: CPT | Mod: CPTII,S$GLB,, | Performed by: OBSTETRICS & GYNECOLOGY

## 2023-12-14 PROCEDURE — 87624 HPV HI-RISK TYP POOLED RSLT: CPT | Performed by: OBSTETRICS & GYNECOLOGY

## 2023-12-14 PROCEDURE — 88175 CYTOPATH C/V AUTO FLUID REDO: CPT | Performed by: OBSTETRICS & GYNECOLOGY

## 2023-12-14 PROCEDURE — 3008F BODY MASS INDEX DOCD: CPT | Mod: CPTII,S$GLB,, | Performed by: OBSTETRICS & GYNECOLOGY

## 2023-12-14 PROCEDURE — 3074F PR MOST RECENT SYSTOLIC BLOOD PRESSURE < 130 MM HG: ICD-10-PCS | Mod: CPTII,S$GLB,, | Performed by: OBSTETRICS & GYNECOLOGY

## 2023-12-14 PROCEDURE — 1159F MED LIST DOCD IN RCRD: CPT | Mod: CPTII,S$GLB,, | Performed by: OBSTETRICS & GYNECOLOGY

## 2023-12-14 PROCEDURE — 99396 PREV VISIT EST AGE 40-64: CPT | Mod: S$GLB,,, | Performed by: OBSTETRICS & GYNECOLOGY

## 2023-12-14 PROCEDURE — 3074F SYST BP LT 130 MM HG: CPT | Mod: CPTII,S$GLB,, | Performed by: OBSTETRICS & GYNECOLOGY

## 2023-12-14 PROCEDURE — 3008F PR BODY MASS INDEX (BMI) DOCUMENTED: ICD-10-PCS | Mod: CPTII,S$GLB,, | Performed by: OBSTETRICS & GYNECOLOGY

## 2023-12-14 PROCEDURE — 1159F PR MEDICATION LIST DOCUMENTED IN MEDICAL RECORD: ICD-10-PCS | Mod: CPTII,S$GLB,, | Performed by: OBSTETRICS & GYNECOLOGY

## 2023-12-14 RX ORDER — TIZANIDINE 4 MG/1
TABLET ORAL
COMMUNITY
Start: 2023-07-17

## 2023-12-14 NOTE — PATIENT INSTRUCTIONS
[email or web address]    ___________________________________________________      Please check out the [employer] PATIENT WEBSITE.  The site has education materials, patient stories, expert views, and a portal for you to ask questions.       [email or web address]      As always, please let me know if you have any questions.     Dr. Katherine Bocanegra

## 2023-12-14 NOTE — PROGRESS NOTES
Chief Complaint: Well Woman Exam     HPI:      Tobi Dennies is a 43 y.o.  who presents today for well woman exam.  LMP: No LMP recorded. (Menstrual status: Birth Control).  No issues, problems, or complaints. Specifically, patient denies abnormal vaginal bleeding, discharge, pelvic pain, urinary problems, or changes in appetite. Ms. Dennies is currently sexually active with a single male partner. She is currently using IUD for contraception. She declines STD screening today.    Previous Pap:  no abnormalities (2021)  Previous Mammogram:     Results for orders placed during the hospital encounter of 23    Mammo Digital Screening Bilat w/ Marino    Narrative  Result:  Mammo Digital Screening Bilat w/ Marino    History:  Patient is 43 y.o. and is seen for a screening mammogram.      Films Compared:  Compared to: 2022 US Breast Bilateral Limited, 2022 Mammo Digital Diagnostic Right with Marino, 2021 Mammo Digital Screening Bilat w/ Marino, and 2019 Mammo Previous    Findings:  This procedure was performed using tomosynthesis.  Computer-aided detection was utilized in the interpretation of this examination.    The breasts are extremely dense, which lowers the sensitivity of mammography. There is no evidence of suspicious masses, microcalcifications or architectural distortion.    Impression  No mammographic evidence of malignancy.    BI-RADS Category 1: Negative    Recommendation:  Routine screening mammogram in 1 year is recommended.    Your estimated lifetime risk of breast cancer (to age 85) based on Tyrer-Cuzick risk assessment model is 7.97 %.  According to the American Cancer Society, patients with a lifetime breast cancer risk of 20% or higher might benefit from supplemental screening tests. ??     Most Recent Dexa: not indicated  Colonoscopy: never had    Gardasil:Completed     Patient Active Problem List   Diagnosis    Mild intermittent asthma without complication    Generalized  "anxiety disorder    Seasonal allergies    Insomnia    ADHD (attention deficit hyperactivity disorder), combined type       Past Medical History:   Diagnosis Date    Anxiety     Asthma     age 4       Past Surgical History:   Procedure Laterality Date    cyst removal of scalp         OB History          1    Para   1    Term   1            AB        Living   1         SAB        IAB        Ectopic        Multiple        Live Births   1           Obstetric Comments   Menarche at 13  No abnormal pap  No STDs               ROS:     Review of Systems   Constitutional:  Negative for activity change, appetite change and fatigue.   Respiratory:  Negative for shortness of breath.    Cardiovascular:  Negative for chest pain.   Gastrointestinal:  Negative for abdominal pain, constipation and diarrhea.   Endocrine: Negative for cold intolerance and heat intolerance.   Genitourinary:  Negative for dysuria, frequency, menstrual irregularity, pelvic pain and vaginal discharge.   Integumentary:  Negative for breast mass, breast discharge and breast tenderness.   Psychiatric/Behavioral:  Negative for dysphoric mood. The patient is not nervous/anxious.    Breast: Negative for mass and tenderness      Physical Exam:      PHYSICAL EXAM:  /70   Ht 5' 1" (1.549 m)   Wt 45 kg (99 lb 3.3 oz)   BMI 18.74 kg/m²   Body mass index is 18.74 kg/m².     APPEARANCE: Well nourished, well developed, in no acute distress.  PSYCH: Appropriate mood and affect.  SKIN: No acne or hirsutism  NECK: Neck symmetric without masses or thyromegaly  NODES: No inguinal, axillary, or supraclavicular lymph node enlargement  ABDOMEN: Soft.  No tenderness or masses.    CARDIOVASCULAR: No edema of peripheral extremities  BREASTS: Symmetrical, no skin changes or visible lesions.  No palpable masses or nipple discharge bilaterally.  PELVIC: Normal external genitalia without lesions.  Normal hair distribution.  Adequate perineal body, normal " urethral meatus.  Vagina moist and well rugated without lesions or discharge.  Cervix pink, without lesions, discharge or tenderness.  IUD strings at os. No significant cystocele or rectocele.  Bimanual exam shows uterus to be normal size, regular, mobile and nontender.  Adnexa without masses or tenderness.      Assessment:     1. Encounter for annual routine gynecological examination        2. Breast cancer screening by mammogram  Mammo Digital Diagnostic Bilat with Marino      3. Cervical cancer screening  Liquid-Based Pap Smear, Screening    HPV High Risk Genotypes, PCR      4. Colon cancer screening  Ambulatory referral/consult to Endo Procedure       5. Benign breast cyst in female, left  US Breast Left Limited            Plan:     Clinical breast exam performed.  Pap collected.  Mammogram and US ordered for screening and follow up of left breast cyst.  DEXA at 63.  Colonoscopy ordered.  Contraception: IUD, due for replacement 2025. Patient considering replacement vs lap BS/endometrial ablation.   Follow up in about 1 year (around 12/14/2024) for annual well woman exam or as needed.    Counseling:     Patient was counseled today on current ASCCP pap guidelines, the recommendation for yearly pelvic exams, healthy diet and exercise routines, breast self awareness and annual mammograms.She is to see her PCP for other health maintenance.     Use of the [employer] Patient Portal discussed and encouraged during today's visit.         Katherine Bocanegra MD  [facility]  12/14/2023

## 2023-12-21 ENCOUNTER — PATIENT MESSAGE (OUTPATIENT)
Dept: ADMINISTRATIVE | Facility: OTHER | Age: 43
End: 2023-12-21
Payer: COMMERCIAL

## 2024-01-02 LAB
FINAL PATHOLOGIC DIAGNOSIS: NORMAL
Lab: NORMAL

## 2024-01-08 ENCOUNTER — OFFICE VISIT (OUTPATIENT)
Dept: PRIMARY CARE CLINIC | Facility: CLINIC | Age: 44
End: 2024-01-08
Payer: COMMERCIAL

## 2024-01-08 VITALS
WEIGHT: 99.88 LBS | BODY MASS INDEX: 18.86 KG/M2 | DIASTOLIC BLOOD PRESSURE: 78 MMHG | HEART RATE: 76 BPM | HEIGHT: 61 IN | OXYGEN SATURATION: 98 % | SYSTOLIC BLOOD PRESSURE: 118 MMHG

## 2024-01-08 DIAGNOSIS — Z12.12 ENCOUNTER FOR COLORECTAL CANCER SCREENING: ICD-10-CM

## 2024-01-08 DIAGNOSIS — Z00.00 ANNUAL PHYSICAL EXAM: Primary | ICD-10-CM

## 2024-01-08 DIAGNOSIS — F90.2 ADHD (ATTENTION DEFICIT HYPERACTIVITY DISORDER), COMBINED TYPE: ICD-10-CM

## 2024-01-08 DIAGNOSIS — J45.20 MILD INTERMITTENT ASTHMA WITHOUT COMPLICATION: ICD-10-CM

## 2024-01-08 DIAGNOSIS — Z12.11 ENCOUNTER FOR COLORECTAL CANCER SCREENING: ICD-10-CM

## 2024-01-08 DIAGNOSIS — F41.1 GENERALIZED ANXIETY DISORDER: ICD-10-CM

## 2024-01-08 PROCEDURE — 99396 PREV VISIT EST AGE 40-64: CPT | Mod: S$GLB,,, | Performed by: INTERNAL MEDICINE

## 2024-01-08 PROCEDURE — 3008F BODY MASS INDEX DOCD: CPT | Mod: CPTII,S$GLB,, | Performed by: INTERNAL MEDICINE

## 2024-01-08 PROCEDURE — 3074F SYST BP LT 130 MM HG: CPT | Mod: CPTII,S$GLB,, | Performed by: INTERNAL MEDICINE

## 2024-01-08 PROCEDURE — 99999 PR PBB SHADOW E&M-EST. PATIENT-LVL IV: CPT | Mod: PBBFAC,,, | Performed by: INTERNAL MEDICINE

## 2024-01-08 PROCEDURE — 3078F DIAST BP <80 MM HG: CPT | Mod: CPTII,S$GLB,, | Performed by: INTERNAL MEDICINE

## 2024-01-08 PROCEDURE — 1159F MED LIST DOCD IN RCRD: CPT | Mod: CPTII,S$GLB,, | Performed by: INTERNAL MEDICINE

## 2024-01-08 RX ORDER — ALBUTEROL SULFATE 90 UG/1
2 AEROSOL, METERED RESPIRATORY (INHALATION) EVERY 6 HOURS PRN
Qty: 18 G | Refills: 3 | Status: SHIPPED | OUTPATIENT
Start: 2024-01-08 | End: 2025-01-07

## 2024-01-08 NOTE — ASSESSMENT & PLAN NOTE
Sees psych NP Naresh Crews. Stable on Wellbutrin, Buspar with ativan PRN.  No SI/HI/panic attacks.  Wellbutrin added last visit.  Father diagnosed with cirrhosis and 17 year old cat is sick.

## 2024-01-08 NOTE — PROGRESS NOTES
[facility] Note    Chief Complaint      Chief Complaint   Patient presents with    Follow-up     History of Present Illness      Tobi Dennies is a 43 y.o. female who presents today for Annual preventative visit.  Patient comes to appointment alone.  Psych: Naresh Crews, GYN: Katherine Bocanegra, Neuro: Ivana    Problem List Items Addressed This Visit       Mild intermittent asthma without complication    Current Assessment & Plan     Stable on albuterol PRN, no SOB/wheeze.  Has not needed inhaler lately.         Relevant Medications    albuterol (VENTOLIN HFA) 90 mcg/actuation inhaler    Generalized anxiety disorder    Current Assessment & Plan     Sees psych NP Naresh Crews. Stable on Wellbutrin, Buspar with ativan PRN.  No SI/HI/panic attacks.  Wellbutrin added last visit.  Father diagnosed with cirrhosis and 17 year old cat is sick.         ADHD (attention deficit hyperactivity disorder), combined type    Current Assessment & Plan     Stable on Adderall XR 10 mg. sees psych NP Naresh Crews.  Previously on vyvanse.          Other Visit Diagnoses       Annual physical exam    -  Primary    Relevant Orders    CBC Auto Differential    Lipid Panel    Comprehensive Metabolic Panel    TSH    Encounter for colorectal cancer screening        Relevant Orders    Ambulatory referral/consult to Endo Procedure               Health Maintenance   Topic Date Due    TETANUS VACCINE  Never done    Colorectal Cancer Screening  Never done    Mammogram  03/08/2024    Lipid Panel  11/12/2027    Hepatitis C Screening  Completed       Past Medical History:   Diagnosis Date    Anxiety 2010    Asthma     age 4       Past Surgical History:   Procedure Laterality Date    cyst removal of scalp         family history includes ADD / ADHD (age of onset: 8) in her daughter. She was adopted.    Social History     Tobacco Use    Smoking status: Never    Smokeless tobacco: Never   Substance Use Topics    Alcohol use:  Not Currently     Comment: socially; twice per month    Drug use: Never       Review of Systems   Constitutional:  Negative for chills and fever.   Respiratory:  Negative for cough and shortness of breath.    Cardiovascular:  Negative for chest pain and palpitations.   Gastrointestinal:  Negative for constipation, diarrhea, nausea and vomiting.   Genitourinary:  Negative for dysuria and hematuria.   Musculoskeletal:  Negative for falls.   Neurological:  Negative for headaches.        Outpatient Encounter Medications as of 1/8/2024   Medication Sig Dispense Refill    buPROPion (WELLBUTRIN XL) 150 MG TB24 tablet Take 1 tablet (150 mg total) by mouth once daily. 30 tablet 2    busPIRone (BUSPAR) 15 MG tablet Take 1 tablet (15 mg total) by mouth 2 (two) times a day. 60 tablet 3    [START ON 1/19/2024] dextroamphetamine-amphetamine (ADDERALL XR) 10 MG 24 hr capsule Take 1 capsule (10 mg total) by mouth every morning. 30 capsule 0    levonorgestreL (MIRENA) 20 mcg/24 hours (6 yrs) 52 mg IUD 1 each by Intrauterine route once.      multivitamin (THERAGRAN) per tablet Take 1 tablet by mouth once daily.      rizatriptan (MAXALT-MLT) 5 MG disintegrating tablet Take at onset of migraine, can repeat in 2 hrs if needed.  No more than 2 tabs per day or 3 days/wk. 12 tablet 3    tiZANidine (ZANAFLEX) 4 MG tablet       [DISCONTINUED] albuterol (VENTOLIN HFA) 90 mcg/actuation inhaler Inhale 2 puffs into the lungs every 6 (six) hours as needed for Wheezing. Rescue 18 g 3    albuterol (VENTOLIN HFA) 90 mcg/actuation inhaler Inhale 2 puffs into the lungs every 6 (six) hours as needed for Wheezing. Rescue 18 g 3    dextroamphetamine-amphetamine (ADDERALL XR) 10 MG 24 hr capsule Take 1 capsule (10 mg total) by mouth every morning. 30 capsule 0    [DISCONTINUED] dextroamphetamine-amphetamine (ADDERALL XR) 10 MG 24 hr capsule Take 1 capsule (10 mg total) by mouth every morning. (Patient not taking: Reported on 1/8/2024) 30 capsule 0     "[DISCONTINUED] methylPREDNISolone (MEDROL DOSEPACK) 4 mg tablet use as directed (Patient not taking: Reported on 1/8/2024) 1 each 0     No facility-administered encounter medications on file as of 1/8/2024.        Review of patient's allergies indicates:  No Known Allergies    Physical Exam      Vital Signs  Pulse: 76  SpO2: 98 %  BP: 118/78  Pain Score: 0-No pain  Height and Weight  Height: 5' 1" (154.9 cm)  Weight: 45.3 kg (99 lb 13.9 oz)  BSA (Calculated - sq m): 1.4 sq meters  BMI (Calculated): 18.9  Weight in (lb) to have BMI = 25: 132]    Physical Exam  Constitutional:       Appearance: She is well-developed.   HENT:      Head: Normocephalic and atraumatic.   Cardiovascular:      Rate and Rhythm: Normal rate and regular rhythm.      Heart sounds: Normal heart sounds. No murmur heard.  Pulmonary:      Effort: Pulmonary effort is normal. No respiratory distress.      Breath sounds: Normal breath sounds.   Abdominal:      General: There is no distension.      Palpations: Abdomen is soft.      Tenderness: There is no abdominal tenderness. There is no guarding.   Skin:     General: Skin is warm and dry.   Neurological:      Mental Status: She is alert. Mental status is at baseline.   Psychiatric:         Behavior: Behavior normal.          Laboratory:  CBC:  No results for input(s): "WBC", "RBC", "HGB", "HCT", "PLT", "MCV", "MCH", "MCHC" in the last 2160 hours.  CMP:  No results for input(s): "GLU", "CALCIUM", "ALBUMIN", "PROT", "NA", "K", "CO2", "CL", "BUN", "ALKPHOS", "ALT", "AST", "BILITOT" in the last 2160 hours.    Invalid input(s): "CREATININ"  URINALYSIS:  No results for input(s): "COLORU", "CLARITYU", "SPECGRAV", "PHUR", "PROTEINUA", "GLUCOSEU", "BILIRUBINCON", "BLOODU", "WBCU", "RBCU", "BACTERIA", "MUCUS", "NITRITE", "LEUKOCYTESUR", "UROBILINOGEN", "HYALINECASTS" in the last 2160 hours.   LIPIDS:  No results for input(s): "TSH", "HDL", "CHOL", "TRIG", "LDLCALC", "CHOLHDL", "NONHDLCHOL", "TOTALCHOLEST" in " "the last 2160 hours.  TSH:  No results for input(s): "TSH" in the last 2160 hours.  A1C:  No results for input(s): "HGBA1C" in the last 2160 hours.    Radiology:  No results found in the last 30 days.     Assessment/Plan     Tobi Dennies is a 43 y.o.female with:    1. Annual physical exam  - CBC Auto Differential; Future  - Lipid Panel; Future  - Comprehensive Metabolic Panel; Future  - TSH; Future    2. Generalized anxiety disorder    3. ADHD (attention deficit hyperactivity disorder), combined type    4. Mild intermittent asthma without complication  - albuterol (VENTOLIN HFA) 90 mcg/actuation inhaler; Inhale 2 puffs into the lungs every 6 (six) hours as needed for Wheezing. Rescue  Dispense: 18 g; Refill: 3    5. Encounter for colorectal cancer screening  - Ambulatory referral/consult to Endo Procedure ; Future    -labs ordered  -declined COVID booster and TDap  -referred for cscope  -Continue current medications and maintain follow up with specialists.    -Follow up in about 1 year (around 1/8/2025) for Annual Visit.       Meryl Hall MD  [facility]                    "

## 2024-01-10 ENCOUNTER — TELEPHONE (OUTPATIENT)
Dept: ENDOSCOPY | Facility: HOSPITAL | Age: 44
End: 2024-01-10

## 2024-01-10 NOTE — TELEPHONE ENCOUNTER
Telephoned pt to schedule colonoscopy x2 attempts with no answer.  Voicemail messages left with endoscopy scheduling number for pt to return call.  Portal message also sent.

## 2024-02-21 ENCOUNTER — OFFICE VISIT (OUTPATIENT)
Dept: PSYCHIATRY | Facility: CLINIC | Age: 44
End: 2024-02-21
Payer: COMMERCIAL

## 2024-02-21 DIAGNOSIS — F32.A DEPRESSION, UNSPECIFIED DEPRESSION TYPE: ICD-10-CM

## 2024-02-21 DIAGNOSIS — F41.1 GENERALIZED ANXIETY DISORDER: ICD-10-CM

## 2024-02-21 DIAGNOSIS — F90.2 ADHD (ATTENTION DEFICIT HYPERACTIVITY DISORDER), COMBINED TYPE: Primary | ICD-10-CM

## 2024-02-21 PROCEDURE — 99214 OFFICE O/P EST MOD 30 MIN: CPT | Mod: 95,,, | Performed by: NURSE PRACTITIONER

## 2024-02-21 RX ORDER — BUPROPION HYDROCHLORIDE 300 MG/1
300 TABLET ORAL DAILY
Qty: 30 TABLET | Refills: 5 | Status: SHIPPED | OUTPATIENT
Start: 2024-02-21 | End: 2024-06-12 | Stop reason: SDUPTHER

## 2024-02-21 RX ORDER — DEXTROAMPHETAMINE SACCHARATE, AMPHETAMINE ASPARTATE MONOHYDRATE, DEXTROAMPHETAMINE SULFATE AND AMPHETAMINE SULFATE 2.5; 2.5; 2.5; 2.5 MG/1; MG/1; MG/1; MG/1
10 CAPSULE, EXTENDED RELEASE ORAL EVERY MORNING
Qty: 30 CAPSULE | Refills: 0 | Status: SHIPPED | OUTPATIENT
Start: 2024-04-17 | End: 2024-05-28 | Stop reason: SDUPTHER

## 2024-02-21 RX ORDER — DEXTROAMPHETAMINE SACCHARATE, AMPHETAMINE ASPARTATE MONOHYDRATE, DEXTROAMPHETAMINE SULFATE AND AMPHETAMINE SULFATE 2.5; 2.5; 2.5; 2.5 MG/1; MG/1; MG/1; MG/1
10 CAPSULE, EXTENDED RELEASE ORAL EVERY MORNING
Qty: 30 CAPSULE | Refills: 0 | Status: SHIPPED | OUTPATIENT
Start: 2024-02-21 | End: 2024-03-22

## 2024-02-21 RX ORDER — BUSPIRONE HYDROCHLORIDE 15 MG/1
15 TABLET ORAL 2 TIMES DAILY
Qty: 60 TABLET | Refills: 5 | Status: SHIPPED | OUTPATIENT
Start: 2024-02-21 | End: 2024-06-12 | Stop reason: SDUPTHER

## 2024-02-21 RX ORDER — DEXTROAMPHETAMINE SACCHARATE, AMPHETAMINE ASPARTATE MONOHYDRATE, DEXTROAMPHETAMINE SULFATE AND AMPHETAMINE SULFATE 2.5; 2.5; 2.5; 2.5 MG/1; MG/1; MG/1; MG/1
10 CAPSULE, EXTENDED RELEASE ORAL EVERY MORNING
Qty: 30 CAPSULE | Refills: 0 | Status: SHIPPED | OUTPATIENT
Start: 2024-03-20 | End: 2024-04-19

## 2024-02-21 NOTE — PROGRESS NOTES
"Outpatient Psychiatry Follow-Up Visit (MD/NP)    2/21/2024    Clinical Status of Patient:  Outpatient (Ambulatory)    Chief Complaint:  Tobi Dennies is a 44 y.o. female who presents today for follow-up of anxiety and attention problems.  Met with patient.     The patient location is: [place]   The chief complaint leading to consultation is: ADHD, anxiety    Visit type: audio only (technical issues with video)    Face-to-face time spent: 20 minutes  24 minutes total time spent. This includes face to face time and non-face to face time preparing to see the patient (eg, review of tests), obtaining and/or reviewing separately obtained history, documenting clinical information in the electronic or other health record, independently interpreting results and communicating results to the patient/family/caregiver, or care coordinator.     Each patient to whom he or she provides medical services by telemedicine is:  (1) informed of the relationship between the physician and patient and the respective role of any other health care provider with respect to management of the patient; and (2) notified that he or she may decline to receive medical services by telemedicine and may withdraw from such care at any time.    Notes:       Interval History and Content of Current Session:    Social/medical updates -- cat had a stroke, has recovered, now on multiple medications. Father diagnosed with cirrhosis and kidney failure, have given him ~ 1 year to live. Mother is elderly, patient assists with their needs. Continues in relationship with boyfriend.     Mood -- mild improvement since last appointment. Finds it somewhat easier to motivate herself to do things, though still struggles at times.   Anxiety -- increased in context of current stressors, "I'll have anxiety when I'm triggered by something." Feels overwhelmed at times, however not unregulated, no anxiety or panic attacks.   Attention -- has good control of inattention " "associated with symptoms of making careless mistakes, failure to complete tasks, disorganization, avoids tasks requiring sustained attention, forgetfulness, distractibility   Psychosis -- no  Sleep -- disturbed due to sleeping situation, sleeping on sofa with her cat  Energy -- "I'm more mentally exhausted," denies physical fatigue   Appetite -- adequate    Substance use -- rare ETOH. No illicit substance use.     Medications:    Bupropion  mg daily -- compliant, denies SE, titrate to 300 mg   Adderall XR 10 mg daily -- takes daily, denies SE  Buspirone 15 mg BID -- takes 1-2x daily, denies SE, encouraged regular BID use    Lorazepam 0.5 mg PRN -- rare demand, old script    OTC -- melatonin, multivitamins     Previous medications: lexapro, zoloft, ativan, trazodone    Brief synopsis:  Mild depression, no SI/HI/AVH    Review of Systems   PSYCHIATRIC: Pertinant items are noted in the narrative.  CONSTITUTIONAL: No weight gain or loss.   MUSCULOSKELETAL: No pain or stiffness of the joints.  NEUROLOGIC: No weakness, sensory changes, seizures, confusion, memory loss, tremor or other abnormal movements.  GASTROINTESTINAL: No nausea, vomiting, pain, constipation or diarrhea.    Past Medical, Family and Social History: The patient's past medical, family and social history have been reviewed and updated as appropriate within the electronic medical record - see encounter notes.    Compliance: see above    Side effects: see above    Risk Parameters:  Patient reports no suicidal ideation  Patient reports no homicidal ideation  Patient reports no self-injurious behavior  Patient reports no violent behavior    Exam (detailed: at least 9 elements; comprehensive: all 15 elements)   Constitutional  Vitals:  Most recent vital signs, dated less than 90 days prior to this appointment, were reviewed.   There were no vitals filed for this visit.       General:  unremarkable, age appropriate,     Musculoskeletal  Muscle " Strength/Tone:  not examined telemedicine    Gait & Station:  QUENTIN  telemedicine      Psychiatric  Speech:  no latency; no press   Mood & Affect:  dysphoric  full   Thought Process:  normal and logical   Associations:  intact   Thought Content:  normal, no suicidality, no homicidality, delusions, or paranoia   Insight:  intact   Judgement: behavior is adequate to circumstances   Orientation:  grossly intact   Memory: intact for content of interview   Language: grossly intact   Attention Span & Concentration:  able to focus   Fund of Knowledge:  intact and appropriate to age and level of education     Assessment and Diagnosis   Status/Progress: Based on the examination today, the patient's problem(s) is/are adequately but not ideally controlled.  New problems have been presented today.   Co-morbidities, Diagnostic uncertainty, and Lack of compliance are not complicating management of the primary condition.  There are no active rule-out diagnoses for this patient at this time.     General Impression: Tobi Dennies is a 44 y.o. female with a psychiatric hx of LYLY and ADHD. Medical hx is largely benign. Family MH hx significant for anxiety. Patient with psychotropic treatment of anxiety with SSRIs with minimal effect. Started on Vyvanse in 10/2022 with positive effect on attention deficit and anxiety sx. No hx of psychiatric hospitalizations. No hx of suicide attempts, self-injurious behavior, or violence. No hx of substance abuse.     04/14/23 -- sx stable. BP/HR elevated, patient attributes to rushing to appointment from garage. BP significantly decreased upon recheck, HR tachycardic.    07/13/23 -- multiple stressors exacerbating anxiety. Overall appears to be coping effectively. Reports Vyvanse at lower dose of 20 mg is effective. No medication changes made.    11/14/23 -- presents with sx consistent with depression, possibly SAD. Start bupropion  mg daily. Continue Adderall XR and buspirone as prescribed.      02/21/24 -- partial response to bupropion XL, will titrate to 300 mg. Continue Adderall XR and buspirone as prescribed.       ICD-10-CM ICD-9-CM   1. ADHD (attention deficit hyperactivity disorder), combined type  F90.2 314.01   2. Generalized anxiety disorder  F41.1 300.02   3. Depression, unspecified depression type  F32.A 311         Intervention/Counseling/Treatment Plan   Reviewed patient's symptoms and medication regimen  Medication changes as above  Next visit in-person    Medication Management  Prescription drug management was employed during the encounter, as medications were prescribed, or considered but not prescribed.   [facility] reviewed  The risks and benefits of medication were discussed with the patient.  Possible expectable adverse effects of any current or proposed individual psychotropic agents were discussed with this patient.  Counseling was provided on the importance of full compliance with any prescribed medication.  Detailed instructions were provided to the patient regarding the administration of any prescribed medication.  Patient voiced understanding    Return to Clinic: 3 months

## 2024-02-22 ENCOUNTER — PATIENT MESSAGE (OUTPATIENT)
Dept: PSYCHIATRY | Facility: CLINIC | Age: 44
End: 2024-02-22
Payer: COMMERCIAL

## 2024-04-04 DIAGNOSIS — J45.20 MILD INTERMITTENT ASTHMA WITHOUT COMPLICATION: ICD-10-CM

## 2024-04-04 NOTE — TELEPHONE ENCOUNTER
No care due was identified.  [facility] Embedded Care Due Messages. Reference number: [id number].   4/04/2024 1:05:59 PM CDT

## 2024-04-05 RX ORDER — ALBUTEROL SULFATE 90 UG/1
2 AEROSOL, METERED RESPIRATORY (INHALATION) EVERY 6 HOURS PRN
Qty: 25.5 G | Refills: 3 | Status: SHIPPED | OUTPATIENT
Start: 2024-04-05

## 2024-04-05 NOTE — TELEPHONE ENCOUNTER
Refill Decision Note   Tobi Dennies  is requesting a refill authorization.  Brief Assessment and Rationale for Refill:  Approve     Medication Therapy Plan:         Comments:     Note composed:2:46 PM 04/05/2024

## 2024-05-28 DIAGNOSIS — F90.2 ADHD (ATTENTION DEFICIT HYPERACTIVITY DISORDER), COMBINED TYPE: ICD-10-CM

## 2024-05-28 RX ORDER — DEXTROAMPHETAMINE SACCHARATE, AMPHETAMINE ASPARTATE MONOHYDRATE, DEXTROAMPHETAMINE SULFATE AND AMPHETAMINE SULFATE 2.5; 2.5; 2.5; 2.5 MG/1; MG/1; MG/1; MG/1
10 CAPSULE, EXTENDED RELEASE ORAL EVERY MORNING
Qty: 30 CAPSULE | Refills: 0 | Status: SHIPPED | OUTPATIENT
Start: 2024-05-28 | End: 2024-06-12 | Stop reason: SDUPTHER

## 2024-06-12 ENCOUNTER — OFFICE VISIT (OUTPATIENT)
Dept: PSYCHIATRY | Facility: CLINIC | Age: 44
End: 2024-06-12
Payer: COMMERCIAL

## 2024-06-12 VITALS
SYSTOLIC BLOOD PRESSURE: 107 MMHG | WEIGHT: 97.75 LBS | DIASTOLIC BLOOD PRESSURE: 62 MMHG | HEART RATE: 95 BPM | BODY MASS INDEX: 18.47 KG/M2

## 2024-06-12 DIAGNOSIS — F90.2 ADHD (ATTENTION DEFICIT HYPERACTIVITY DISORDER), COMBINED TYPE: Primary | ICD-10-CM

## 2024-06-12 DIAGNOSIS — F41.1 GENERALIZED ANXIETY DISORDER: ICD-10-CM

## 2024-06-12 DIAGNOSIS — F32.A DEPRESSION, UNSPECIFIED DEPRESSION TYPE: ICD-10-CM

## 2024-06-12 PROCEDURE — 3074F SYST BP LT 130 MM HG: CPT | Mod: CPTII,S$GLB,, | Performed by: NURSE PRACTITIONER

## 2024-06-12 PROCEDURE — 99214 OFFICE O/P EST MOD 30 MIN: CPT | Mod: S$GLB,,, | Performed by: NURSE PRACTITIONER

## 2024-06-12 PROCEDURE — 99999 PR PBB SHADOW E&M-EST. PATIENT-LVL II: CPT | Mod: PBBFAC,,, | Performed by: NURSE PRACTITIONER

## 2024-06-12 PROCEDURE — 3008F BODY MASS INDEX DOCD: CPT | Mod: CPTII,S$GLB,, | Performed by: NURSE PRACTITIONER

## 2024-06-12 PROCEDURE — 3078F DIAST BP <80 MM HG: CPT | Mod: CPTII,S$GLB,, | Performed by: NURSE PRACTITIONER

## 2024-06-12 RX ORDER — DEXTROAMPHETAMINE SACCHARATE, AMPHETAMINE ASPARTATE MONOHYDRATE, DEXTROAMPHETAMINE SULFATE AND AMPHETAMINE SULFATE 2.5; 2.5; 2.5; 2.5 MG/1; MG/1; MG/1; MG/1
10 CAPSULE, EXTENDED RELEASE ORAL EVERY MORNING
Qty: 30 CAPSULE | Refills: 0 | Status: SHIPPED | OUTPATIENT
Start: 2024-06-26 | End: 2024-07-26

## 2024-06-12 RX ORDER — DEXTROAMPHETAMINE SACCHARATE, AMPHETAMINE ASPARTATE MONOHYDRATE, DEXTROAMPHETAMINE SULFATE AND AMPHETAMINE SULFATE 2.5; 2.5; 2.5; 2.5 MG/1; MG/1; MG/1; MG/1
10 CAPSULE, EXTENDED RELEASE ORAL EVERY MORNING
Qty: 30 CAPSULE | Refills: 0 | Status: SHIPPED | OUTPATIENT
Start: 2024-08-21 | End: 2024-09-20

## 2024-06-12 RX ORDER — BUSPIRONE HYDROCHLORIDE 15 MG/1
15 TABLET ORAL 2 TIMES DAILY
Qty: 60 TABLET | Refills: 5 | Status: SHIPPED | OUTPATIENT
Start: 2024-06-12

## 2024-06-12 RX ORDER — BUPROPION HYDROCHLORIDE 300 MG/1
300 TABLET ORAL DAILY
Qty: 30 TABLET | Refills: 5 | Status: SHIPPED | OUTPATIENT
Start: 2024-06-12

## 2024-06-12 RX ORDER — DEXTROAMPHETAMINE SACCHARATE, AMPHETAMINE ASPARTATE MONOHYDRATE, DEXTROAMPHETAMINE SULFATE AND AMPHETAMINE SULFATE 2.5; 2.5; 2.5; 2.5 MG/1; MG/1; MG/1; MG/1
10 CAPSULE, EXTENDED RELEASE ORAL EVERY MORNING
Qty: 30 CAPSULE | Refills: 0 | Status: SHIPPED | OUTPATIENT
Start: 2024-07-24 | End: 2024-08-23

## 2024-06-12 NOTE — PROGRESS NOTES
Outpatient Psychiatry Follow-Up Visit (MD/NP)    6/12/2024    Clinical Status of Patient:  Outpatient (Ambulatory)    Chief Complaint:  Tobi Dennies is a 44 y.o. female who presents today for follow-up of anxiety and attention problems.  Met with patient.     Interval History and Content of Current Session:    Social/medical updates -- Bought home with boyfriend, move in later this month. Father and mother will be moving into her old home. Daughter continues to live with her, dealing with significant anxiety, depression, substance abuse. Father in and out of the hospital.     Mood -- presents with euthymic mood and affect. Denies persistent depressed mood, denies anhedonia. No thoughts of death or SI. No shad.   Anxiety -- situational, largely revolving around daughter's behavior. Patient worries when she does not get home at night. Stress from interactions with daughter who can berate patient, blame her for issues she is having. Reports anxiety is manageable outside of these situations.   Attention -- has good control of inattention associated with symptoms of making careless mistakes, failure to complete tasks, disorganization, avoids tasks requiring sustained attention, forgetfulness, distractibility   Psychosis -- no  Sleep -- regulated, denies insomnia  Energy -- adequate  Appetite -- adequate, no change from baseline, 97 lb today, ~ 2 lb weight loss in 5 months    Substance use -- rare ETOH socially. No illicit substance use.    Medications:     Bupropion  mg daily -- compliant, denies SE  Adderall XR 10 mg daily -- takes daily, denies SE  Buspirone 15 mg BID -- takes 1-2x daily, denies SE    Lorazepam 0.5 mg PRN -- rare demand, old script    OTC -- melatonin, multivitamins     Previous medications: lexapro, zoloft, ativan, trazodone    Brief synopsis:  Sx stable, interpersonal stress, no SI/HI/AVH    Review of Systems   PSYCHIATRIC: Pertinant items are noted in the narrative.  CONSTITUTIONAL: No weight  gain or loss.   MUSCULOSKELETAL: No pain or stiffness of the joints.  NEUROLOGIC: No weakness, sensory changes, seizures, confusion, memory loss, tremor or other abnormal movements.  GASTROINTESTINAL: No nausea, vomiting, pain, constipation or diarrhea.    Past Medical, Family and Social History: The patient's past medical, family and social history have been reviewed and updated as appropriate within the electronic medical record - see encounter notes.    Compliance: see above    Side effects: see above    Risk Parameters:  Patient reports no suicidal ideation  Patient reports no homicidal ideation  Patient reports no self-injurious behavior  Patient reports no violent behavior    Exam (detailed: at least 9 elements; comprehensive: all 15 elements)   Constitutional  Vitals:  Most recent vital signs, dated less than 90 days prior to this appointment, were reviewed.   Vitals:    06/12/24 1529   BP: 107/62   Pulse: 95   Weight: 44.3 kg (97 lb 12.4 oz)          General:  unremarkable, age appropriate,     Musculoskeletal  Muscle Strength/Tone:  no spasicity, no rigidity    Gait & Station:  non-ataxic      Psychiatric  Speech:  no latency; no press   Mood & Affect:  euthymic  full   Thought Process:  normal and logical   Associations:  intact   Thought Content:  normal, no suicidality, no homicidality, delusions, or paranoia   Insight:  intact   Judgement: behavior is adequate to circumstances   Orientation:  grossly intact   Memory: intact for content of interview   Language: grossly intact   Attention Span & Concentration:  able to focus   Fund of Knowledge:  intact and appropriate to age and level of education     Assessment and Diagnosis   Status/Progress: Based on the examination today, the patient's problem(s) is/are adequately but not ideally controlled.  New problems have been presented today.   Co-morbidities, Diagnostic uncertainty, and Lack of compliance are not complicating management of the primary  condition.  There are no active rule-out diagnoses for this patient at this time.     General Impression: Tobi Dennies is a 44 y.o. female with a psychiatric hx of LYLY and ADHD. Medical hx is largely benign. Family MH hx significant for anxiety. Patient with psychotropic treatment of anxiety with SSRIs with minimal effect. Started on Vyvanse in 10/2022 with positive effect on attention deficit and anxiety sx. No hx of psychiatric hospitalizations. No hx of suicide attempts, self-injurious behavior, or violence. No hx of substance abuse.     04/14/23 -- sx stable. BP/HR elevated, patient attributes to rushing to appointment from [facility]. BP significantly decreased upon recheck, HR tachycardic.    07/13/23 -- multiple stressors exacerbating anxiety. Overall appears to be coping effectively. Reports Vyvanse at lower dose of 20 mg is effective. No medication changes made.    11/14/23 -- presents with sx consistent with depression, possibly SAD. Start bupropion  mg daily. Continue Adderall XR and buspirone as prescribed.     02/21/24 -- partial response to bupropion XL, will titrate to 300 mg. Continue Adderall XR and buspirone as prescribed.     06/12/24 -- sx stable overall. Some anxiety in context of daughter's behavior. No medication changes made.      ICD-10-CM ICD-9-CM   1. ADHD (attention deficit hyperactivity disorder), combined type  F90.2 314.01   2. Generalized anxiety disorder  F41.1 300.02   3. Depression, unspecified depression type  F32.A 311           Intervention/Counseling/Treatment Plan   Reviewed patient's symptoms and medication regimen  Continue medications as prescribed  Will consider discontinuing bupropion XL at next visit   Patient not interested in psychotherapy at this time    Medication Management  Prescription drug management was employed during the encounter, as medications were prescribed, or considered but not prescribed.   [facility] reviewed  The risks and benefits of medication  were discussed with the patient.  Possible expectable adverse effects of any current or proposed individual psychotropic agents were discussed with this patient.  Counseling was provided on the importance of full compliance with any prescribed medication.  Detailed instructions were provided to the patient regarding the administration of any prescribed medication.  Patient voiced understanding    Return to Clinic: 3 months

## 2024-09-04 ENCOUNTER — OFFICE VISIT (OUTPATIENT)
Dept: PSYCHIATRY | Facility: CLINIC | Age: 44
End: 2024-09-04
Payer: COMMERCIAL

## 2024-09-04 DIAGNOSIS — F90.2 ADHD (ATTENTION DEFICIT HYPERACTIVITY DISORDER), COMBINED TYPE: ICD-10-CM

## 2024-09-04 DIAGNOSIS — F41.1 GENERALIZED ANXIETY DISORDER: Primary | ICD-10-CM

## 2024-09-04 DIAGNOSIS — F32.A DEPRESSION, UNSPECIFIED DEPRESSION TYPE: ICD-10-CM

## 2024-09-04 RX ORDER — BUSPIRONE HYDROCHLORIDE 15 MG/1
15 TABLET ORAL 2 TIMES DAILY
Qty: 60 TABLET | Refills: 5 | Status: SHIPPED | OUTPATIENT
Start: 2024-09-04

## 2024-09-04 RX ORDER — DEXTROAMPHETAMINE SACCHARATE, AMPHETAMINE ASPARTATE MONOHYDRATE, DEXTROAMPHETAMINE SULFATE AND AMPHETAMINE SULFATE 2.5; 2.5; 2.5; 2.5 MG/1; MG/1; MG/1; MG/1
10 CAPSULE, EXTENDED RELEASE ORAL EVERY MORNING
Qty: 30 CAPSULE | Refills: 0 | Status: SHIPPED | OUTPATIENT
Start: 2024-10-02 | End: 2024-11-01

## 2024-09-04 RX ORDER — DEXTROAMPHETAMINE SACCHARATE, AMPHETAMINE ASPARTATE MONOHYDRATE, DEXTROAMPHETAMINE SULFATE AND AMPHETAMINE SULFATE 2.5; 2.5; 2.5; 2.5 MG/1; MG/1; MG/1; MG/1
10 CAPSULE, EXTENDED RELEASE ORAL EVERY MORNING
Qty: 30 CAPSULE | Refills: 0 | Status: SHIPPED | OUTPATIENT
Start: 2024-10-31 | End: 2024-11-30

## 2024-09-04 RX ORDER — DEXTROAMPHETAMINE SACCHARATE, AMPHETAMINE ASPARTATE MONOHYDRATE, DEXTROAMPHETAMINE SULFATE AND AMPHETAMINE SULFATE 2.5; 2.5; 2.5; 2.5 MG/1; MG/1; MG/1; MG/1
10 CAPSULE, EXTENDED RELEASE ORAL EVERY MORNING
Qty: 30 CAPSULE | Refills: 0 | Status: SHIPPED | OUTPATIENT
Start: 2024-09-04 | End: 2024-10-04

## 2024-09-04 NOTE — PROGRESS NOTES
"Outpatient Psychiatry Follow-Up Visit (MD/NP)    9/4/2024    Clinical Status of Patient:  Outpatient (Ambulatory)    Chief Complaint:  Tobi Dennies is a 44 y.o. female who presents today for follow-up of depression, anxiety, and attention problems.  Met with patient.     The patient location is: [place]  The chief complaint leading to consultation is: depression/anxiety/ADHD    Visit type: audiovisual    Face to Face time with patient: 13 minutes  20 minutes of total time spent on the encounter, which includes face to face time and non-face to face time preparing to see the patient (eg, review of tests), Obtaining and/or reviewing separately obtained history, Documenting clinical information in the electronic or other health record, Independently interpreting results (not separately reported) and communicating results to the patient/family/caregiver, or Care coordination (not separately reported).     Each patient to whom he or she provides medical services by telemedicine is:  (1) informed of the relationship between the physician and patient and the respective role of any other health care provider with respect to management of the patient; and (2) notified that he or she may decline to receive medical services by telemedicine and may withdraw from such care at any time.    Notes:     Interval History and Content of Current Session:    Social/medical updates -- almost done moving into new home with her boyfriend and daughter. Daughter continues to struggle with MH concerns and substance abuse.     Mood -- presents with euthymic mood and affect. Denies persistent depressed mood, denies anhedonia. Realizes now that lack of interest and motivation that she experienced in the past may have been influenced by feelings of loneliness. No thoughts of death or SI. No shad.   Anxiety -- continued situational anxiety, pertaining to father's ongoing health concerns, "it could be any day." For the most part does not have " difficulty relaxing due to this stress, no persistent insomnia. No defined panic attacks.  Attention -- has good control of inattention associated with symptoms of making careless mistakes, failure to complete tasks, disorganization, avoids tasks requiring sustained attention, forgetfulness, distractibility   Psychosis -- no  Sleep -- largely adequate, rare early insomnia   Energy -- adequate, 1-2 cups of coffee daily   Appetite -- adequate, possible mild weight gain    Substance use -- sporadic ETOH socially. No illicit substance use.     Medications:     Bupropion  mg daily -- compliant, taper and discontinue   Adderall XR 10 mg daily -- takes daily, denies SE  Buspirone 15 mg BID -- takes 1-2x daily, denies SE, encouraged full compliance    Lorazepam 0.5 mg PRN -- rare demand, old script    OTC -- melatonin, multivitamins     Previous medications: lexapro, zoloft, ativan, trazodone    Brief synopsis:  Stable mood, situational anxiety, no SI/HI/AVH    Review of Systems   PSYCHIATRIC: Pertinant items are noted in the narrative.  CONSTITUTIONAL: See above.   MUSCULOSKELETAL: No pain or stiffness of the joints.  NEUROLOGIC: No weakness, sensory changes, seizures, confusion, memory loss, tremor or other abnormal movements.  GASTROINTESTINAL: No nausea, vomiting, pain, constipation or diarrhea.    Past Medical, Family and Social History: The patient's past medical, family and social history have been reviewed and updated as appropriate within the electronic medical record - see encounter notes.    Compliance: see above    Side effects: see above    Risk Parameters:  Patient reports no suicidal ideation  Patient reports no homicidal ideation  Patient reports no self-injurious behavior  Patient reports no violent behavior    Exam (detailed: at least 9 elements; comprehensive: all 15 elements)   Constitutional  Vitals:  Most recent vital signs, dated less than 90 days prior to this appointment, were reviewed.    There were no vitals filed for this visit.     General:  unremarkable, age appropriate,     Musculoskeletal  Muscle Strength/Tone:  not examined telemedicine     Gait & Station:  QUENTIN  telemedicine      Psychiatric  Speech:  no latency; no press   Mood & Affect:  euthymic  full   Thought Process:  normal and logical   Associations:  intact   Thought Content:  normal, no suicidality, no homicidality, delusions, or paranoia   Insight:  intact   Judgement: behavior is adequate to circumstances   Orientation:  grossly intact   Memory: intact for content of interview   Language: grossly intact   Attention Span & Concentration:  able to focus   Fund of Knowledge:  intact and appropriate to age and level of education     Assessment and Diagnosis   Status/Progress: Based on the examination today, the patient's problem(s) is/are adequately but not ideally controlled.  New problems have been presented today.   Co-morbidities, Diagnostic uncertainty, and Lack of compliance are not complicating management of the primary condition.  There are no active rule-out diagnoses for this patient at this time.     General Impression: Tobi Dennies is a 44 y.o. female with a psychiatric hx of LYLY and ADHD. Medical hx is largely benign. Family MH hx significant for anxiety. Patient with psychotropic treatment of anxiety with SSRIs with minimal effect. Started on Vyvanse in 10/2022 with positive effect on attention deficit and anxiety sx. No hx of psychiatric hospitalizations. No hx of suicide attempts, self-injurious behavior, or violence. No hx of substance abuse.     04/14/23 -- sx stable. BP/HR elevated, patient attributes to rushing to appointment from garage. BP significantly decreased upon recheck, HR tachycardic.    07/13/23 -- multiple stressors exacerbating anxiety. Overall appears to be coping effectively. Reports Vyvanse at lower dose of 20 mg is effective. No medication changes made.    11/14/23 -- presents with sx consistent  with depression, possibly SAD. Start bupropion  mg daily. Continue Adderall XR and buspirone as prescribed.     02/21/24 -- partial response to bupropion XL, will titrate to 300 mg. Continue Adderall XR and buspirone as prescribed.     06/12/24 -- sx stable overall. Some anxiety in context of daughter's behavior. No medication changes made.    09/04/24 -- mood stable, ongoing situational anxiety associated with stressors. Inattentive sx adequately controlled. Discontinue bupropion XL at patient's request. Continue Adderall XR 10 mg daily, buspirone 15 mg BID      ICD-10-CM ICD-9-CM   1. ADHD (attention deficit hyperactivity disorder), combined type  F90.2 314.01   2. Generalized anxiety disorder  F41.1 300.02   3. Depression, unspecified depression type  F32.A 311       Intervention/Counseling/Treatment Plan   Reviewed patient's symptoms and medication regimen  Medication changes as above  Encouraged full compliance with buspirone     Medication Management  Prescription drug management was employed during the encounter, as medications were prescribed, or considered but not prescribed.   [facility] reviewed  The risks and benefits of medication were discussed with the patient.  Possible expectable adverse effects of any current or proposed individual psychotropic agents were discussed with this patient.  Counseling was provided on the importance of full compliance with any prescribed medication.  Detailed instructions were provided to the patient regarding the administration of any prescribed medication.  Patient voiced understanding    Return to Clinic: 3 months

## 2024-11-29 ENCOUNTER — TELEPHONE (OUTPATIENT)
Dept: RADIOLOGY | Facility: HOSPITAL | Age: 44
End: 2024-11-29
Payer: COMMERCIAL

## 2024-11-29 ENCOUNTER — HOSPITAL ENCOUNTER (OUTPATIENT)
Dept: RADIOLOGY | Facility: HOSPITAL | Age: 44
Discharge: HOME | End: 2024-11-29
Attending: INTERNAL MEDICINE
Payer: COMMERCIAL

## 2024-11-29 DIAGNOSIS — Z12.31 ENCOUNTER FOR SCREENING MAMMOGRAM FOR BREAST CANCER: ICD-10-CM

## 2024-12-06 ENCOUNTER — OFFICE VISIT (OUTPATIENT)
Dept: PSYCHIATRY | Facility: CLINIC | Age: 44
End: 2024-12-06
Payer: COMMERCIAL

## 2024-12-06 DIAGNOSIS — F90.2 ADHD (ATTENTION DEFICIT HYPERACTIVITY DISORDER), COMBINED TYPE: ICD-10-CM

## 2024-12-06 DIAGNOSIS — F32.A DEPRESSION, UNSPECIFIED DEPRESSION TYPE: ICD-10-CM

## 2024-12-06 DIAGNOSIS — F41.1 GENERALIZED ANXIETY DISORDER: Primary | ICD-10-CM

## 2024-12-06 RX ORDER — DEXTROAMPHETAMINE SACCHARATE, AMPHETAMINE ASPARTATE MONOHYDRATE, DEXTROAMPHETAMINE SULFATE AND AMPHETAMINE SULFATE 2.5; 2.5; 2.5; 2.5 MG/1; MG/1; MG/1; MG/1
10 CAPSULE, EXTENDED RELEASE ORAL EVERY MORNING
Qty: 30 CAPSULE | Refills: 0 | Status: SHIPPED | OUTPATIENT
Start: 2024-12-06 | End: 2025-01-05

## 2024-12-06 RX ORDER — DEXTROAMPHETAMINE SACCHARATE, AMPHETAMINE ASPARTATE MONOHYDRATE, DEXTROAMPHETAMINE SULFATE AND AMPHETAMINE SULFATE 2.5; 2.5; 2.5; 2.5 MG/1; MG/1; MG/1; MG/1
10 CAPSULE, EXTENDED RELEASE ORAL EVERY MORNING
Qty: 30 CAPSULE | Refills: 0 | Status: SHIPPED | OUTPATIENT
Start: 2025-01-03 | End: 2025-02-02

## 2024-12-06 RX ORDER — BUSPIRONE HYDROCHLORIDE 15 MG/1
15 TABLET ORAL 2 TIMES DAILY
Qty: 60 TABLET | Refills: 5 | Status: SHIPPED | OUTPATIENT
Start: 2024-12-06

## 2024-12-06 RX ORDER — DEXTROAMPHETAMINE SACCHARATE, AMPHETAMINE ASPARTATE MONOHYDRATE, DEXTROAMPHETAMINE SULFATE AND AMPHETAMINE SULFATE 2.5; 2.5; 2.5; 2.5 MG/1; MG/1; MG/1; MG/1
10 CAPSULE, EXTENDED RELEASE ORAL EVERY MORNING
Qty: 30 CAPSULE | Refills: 0 | Status: SHIPPED | OUTPATIENT
Start: 2025-01-31 | End: 2025-03-02

## 2024-12-06 NOTE — PROGRESS NOTES
"Outpatient Psychiatry Follow-Up Visit (MD/NP)    12/6/2024    Clinical Status of Patient:  Outpatient (Ambulatory)    Chief Complaint:  Tobi Dennies is a 44 y.o. female who presents today for follow-up of depression, anxiety, and attention problems.  Met with patient.     The patient location is: [place]  The chief complaint leading to consultation is: depression/anxiety/ADHD    Visit type: audiovisual    Face to Face time with patient: 22 minutes   28 minutes of total time spent on the encounter, which includes face to face time and non-face to face time preparing to see the patient (eg, review of tests), Obtaining and/or reviewing separately obtained history, Documenting clinical information in the electronic or other health record, Independently interpreting results (not separately reported) and communicating results to the patient/family/caregiver, or Care coordination (not separately reported).     Each patient to whom he or she provides medical services by telemedicine is:  (1) informed of the relationship between the physician and patient and the respective role of any other health care provider with respect to management of the patient; and (2) notified that he or she may decline to receive medical services by telemedicine and may withdraw from such care at any time.    Notes:       Interval History and Content of Current Session:    Social/medical updates -- continues in same residence with boyfriend and her daughter. Father with ongoing medical issues. Hosted Thanksgiving this year.    "They've been."    Mood -- dysphoria in context of current stressors. No defined persistent depressed mood, no marked anhedonia. No thoughts of death or SI. No SIB. No hypomania or shad.  Anxiety -- increased stress at work over the past few months associated with "toxic" new co-worker. Interactions can lead to anxiety attacks, 1-2x weekly. States that outside of stressful interaction or events, anxiety is manageable. No " change in baseline specific phobia of going over larger bridges.  Attention -- has good control of inattention associated with symptoms of making careless mistakes, failure to complete tasks, disorganization, avoids tasks requiring sustained attention, forgetfulness, distractibility   Psychosis -- no  Sleep -- fair, close to baseline, occasional early anxiety associated with stressors (daughter not returning home at night)  Energy -- adequate  Appetite -- somewhat decreased during daytime due to stress, thinks weight is stable     Substance use -- sporadic ETOH socially. No illicit substance use    Medications:     Adderall XR 10 mg daily -- takes on workdays, denies SE  Buspirone 15 mg BID -- takes 1x daily, occasionally 2x, denies SE, encouraged compliance    Lorazepam 0.5 mg PRN -- rare demand, old script    OTC -- melatonin, multivitamins     Previous medication trials: lexapro, zoloft, bupropion, ativan, trazodone    Brief synopsis:  Stable mood, situational anxiety, no SI/HI/AVH    Review of Systems   PSYCHIATRIC: Pertinant items are noted in the narrative.  CONSTITUTIONAL: See above.   MUSCULOSKELETAL: No pain or stiffness of the joints.  NEUROLOGIC: No weakness, sensory changes, seizures, confusion, memory loss, tremor or other abnormal movements.  GASTROINTESTINAL: No nausea, vomiting, pain, constipation or diarrhea.    Past Medical, Family and Social History: The patient's past medical, family and social history have been reviewed and updated as appropriate within the electronic medical record - see encounter notes.    Compliance: see above    Side effects: see above    Risk Parameters:  Patient reports no suicidal ideation  Patient reports no homicidal ideation  Patient reports no self-injurious behavior  Patient reports no violent behavior    Exam (detailed: at least 9 elements; comprehensive: all 15 elements)   Constitutional  Vitals:  Most recent vital signs, dated less than 90 days prior to this  appointment, were reviewed.   There were no vitals filed for this visit.     General:  unremarkable, age appropriate,     Musculoskeletal  Muscle Strength/Tone:  not examined telemedicine     Gait & Station:  QUENTIN  telemedicine      Psychiatric  Speech:  no latency; no press   Mood & Affect:  dysphoric  full   Thought Process:  normal and logical   Associations:  intact   Thought Content:  normal, no suicidality, no homicidality, delusions, or paranoia   Insight:  intact   Judgement: behavior is adequate to circumstances   Orientation:  grossly intact   Memory: intact for content of interview   Language: grossly intact   Attention Span & Concentration:  able to focus   Fund of Knowledge:  intact and appropriate to age and level of education     Assessment and Diagnosis   Status/Progress: Based on the examination today, the patient's problem(s) is/are adequately but not ideally controlled.  New problems have been presented today.   Co-morbidities, Diagnostic uncertainty, and Lack of compliance are not complicating management of the primary condition.  There are no active rule-out diagnoses for this patient at this time.     General Impression: Tobi Dennies is a 44 y.o. female with a psychiatric hx of LYLY and ADHD. Medical hx is largely benign. Family MH hx significant for anxiety. Patient with psychotropic treatment of anxiety with SSRIs with minimal effect. Started on Vyvanse in 10/2022 with positive effect on attention deficit and anxiety sx. No hx of psychiatric hospitalizations. No hx of suicide attempts, self-injurious behavior, or violence. No hx of substance abuse.     04/14/23 -- sx stable. BP/HR elevated, patient attributes to rushing to appointment from garage. BP significantly decreased upon recheck, HR tachycardic.    07/13/23 -- multiple stressors exacerbating anxiety. Overall appears to be coping effectively. Reports Vyvanse at lower dose of 20 mg is effective. No medication changes made.    11/14/23 --  presents with sx consistent with depression, possibly SAD. Start bupropion  mg daily. Continue Adderall XR and buspirone as prescribed.     02/21/24 -- partial response to bupropion XL, will titrate to 300 mg. Continue Adderall XR and buspirone as prescribed.     06/12/24 -- sx stable overall. Some anxiety in context of daughter's behavior. No medication changes made.    09/04/24 -- mood stable, ongoing situational anxiety associated with stressors. Inattentive sx adequately controlled. Discontinue bupropion XL at patient's request. Continue Adderall XR 10 mg daily, buspirone 15 mg BID    12/06/24 -- sx largely at baseline. Increased anxious moments directly associated with specific stressors. No medication changes made      ICD-10-CM ICD-9-CM   1. Generalized anxiety disorder  F41.1 300.02   2. ADHD (attention deficit hyperactivity disorder), combined type  F90.2 314.01   3. Depression, unspecified depression type  F32.A 311         Intervention/Counseling/Treatment Plan   Reviewed patient's symptoms and medication regimen  Continue medications as prescribed   Strongly encouraged full compliance with buspirone   Reminded patient of ordered labs    Medication Management  Prescription drug management was employed during the encounter, as medications were prescribed, or considered but not prescribed.   [facility] reviewed  The risks and benefits of medication were discussed with the patient.  Possible expectable adverse effects of any current or proposed individual psychotropic agents were discussed with this patient.  Counseling was provided on the importance of full compliance with any prescribed medication.  Detailed instructions were provided to the patient regarding the administration of any prescribed medication.  Patient voiced understanding    Return to Clinic: 3 months

## 2025-02-12 ENCOUNTER — LAB VISIT (OUTPATIENT)
Dept: LAB | Facility: HOSPITAL | Age: 45
End: 2025-02-12
Attending: INTERNAL MEDICINE
Payer: COMMERCIAL

## 2025-02-12 ENCOUNTER — OFFICE VISIT (OUTPATIENT)
Dept: PRIMARY CARE CLINIC | Facility: CLINIC | Age: 45
End: 2025-02-12
Payer: COMMERCIAL

## 2025-02-12 ENCOUNTER — TELEPHONE (OUTPATIENT)
Dept: RADIOLOGY | Facility: HOSPITAL | Age: 45
End: 2025-02-12
Payer: COMMERCIAL

## 2025-02-12 VITALS
BODY MASS INDEX: 19.73 KG/M2 | WEIGHT: 104.5 LBS | SYSTOLIC BLOOD PRESSURE: 108 MMHG | HEIGHT: 61 IN | HEART RATE: 89 BPM | DIASTOLIC BLOOD PRESSURE: 70 MMHG | OXYGEN SATURATION: 98 %

## 2025-02-12 DIAGNOSIS — Z12.11 ENCOUNTER FOR COLORECTAL CANCER SCREENING: ICD-10-CM

## 2025-02-12 DIAGNOSIS — Z12.31 ENCOUNTER FOR SCREENING MAMMOGRAM FOR MALIGNANT NEOPLASM OF BREAST: ICD-10-CM

## 2025-02-12 DIAGNOSIS — N60.02 BENIGN BREAST CYST IN FEMALE, LEFT: ICD-10-CM

## 2025-02-12 DIAGNOSIS — F41.1 GENERALIZED ANXIETY DISORDER: ICD-10-CM

## 2025-02-12 DIAGNOSIS — J45.20 MILD INTERMITTENT ASTHMA WITHOUT COMPLICATION: ICD-10-CM

## 2025-02-12 DIAGNOSIS — F90.2 ADHD (ATTENTION DEFICIT HYPERACTIVITY DISORDER), COMBINED TYPE: ICD-10-CM

## 2025-02-12 DIAGNOSIS — Z12.12 ENCOUNTER FOR COLORECTAL CANCER SCREENING: ICD-10-CM

## 2025-02-12 DIAGNOSIS — Z12.39 ENCOUNTER FOR SCREENING FOR MALIGNANT NEOPLASM OF BREAST, UNSPECIFIED SCREENING MODALITY: ICD-10-CM

## 2025-02-12 DIAGNOSIS — G43.809 OTHER MIGRAINE WITHOUT STATUS MIGRAINOSUS, NOT INTRACTABLE: ICD-10-CM

## 2025-02-12 DIAGNOSIS — Z00.00 ANNUAL PHYSICAL EXAM: ICD-10-CM

## 2025-02-12 DIAGNOSIS — Z00.00 ANNUAL PHYSICAL EXAM: Primary | ICD-10-CM

## 2025-02-12 PROBLEM — G43.909 MIGRAINE WITHOUT STATUS MIGRAINOSUS, NOT INTRACTABLE: Status: ACTIVE | Noted: 2025-02-12

## 2025-02-12 LAB
ALBUMIN SERPL BCP-MCNC: 4.6 G/DL (ref 3.5–5.2)
ALP SERPL-CCNC: 52 U/L (ref 40–150)
ALT SERPL W/O P-5'-P-CCNC: 12 U/L (ref 10–44)
ANION GAP SERPL CALC-SCNC: 9 MMOL/L (ref 8–16)
AST SERPL-CCNC: 14 U/L (ref 10–40)
BASOPHILS # BLD AUTO: 0.03 K/UL (ref 0–0.2)
BASOPHILS NFR BLD: 0.6 % (ref 0–1.9)
BILIRUB SERPL-MCNC: 0.5 MG/DL (ref 0.1–1)
BUN SERPL-MCNC: 9 MG/DL (ref 6–20)
CALCIUM SERPL-MCNC: 9.6 MG/DL (ref 8.7–10.5)
CHLORIDE SERPL-SCNC: 107 MMOL/L (ref 95–110)
CHOLEST SERPL-MCNC: 159 MG/DL (ref 120–199)
CHOLEST/HDLC SERPL: 2.5 {RATIO} (ref 2–5)
CO2 SERPL-SCNC: 25 MMOL/L (ref 23–29)
CREAT SERPL-MCNC: 0.7 MG/DL (ref 0.5–1.4)
DIFFERENTIAL METHOD BLD: NORMAL
EOSINOPHIL # BLD AUTO: 0.2 K/UL (ref 0–0.5)
EOSINOPHIL NFR BLD: 4.5 % (ref 0–8)
ERYTHROCYTE [DISTWIDTH] IN BLOOD BY AUTOMATED COUNT: 11.8 % (ref 11.5–14.5)
EST. GFR  (NO RACE VARIABLE): >60 ML/MIN/1.73 M^2
GLUCOSE SERPL-MCNC: 86 MG/DL (ref 70–110)
HCT VFR BLD AUTO: 41.9 % (ref 37–48.5)
HDLC SERPL-MCNC: 63 MG/DL (ref 40–75)
HDLC SERPL: 39.6 % (ref 20–50)
HGB BLD-MCNC: 13.6 G/DL (ref 12–16)
IMM GRANULOCYTES # BLD AUTO: 0.01 K/UL (ref 0–0.04)
IMM GRANULOCYTES NFR BLD AUTO: 0.2 % (ref 0–0.5)
LDLC SERPL CALC-MCNC: 88.2 MG/DL (ref 63–159)
LYMPHOCYTES # BLD AUTO: 1.8 K/UL (ref 1–4.8)
LYMPHOCYTES NFR BLD: 39.7 % (ref 18–48)
MCH RBC QN AUTO: 29.7 PG (ref 27–31)
MCHC RBC AUTO-ENTMCNC: 32.5 G/DL (ref 32–36)
MCV RBC AUTO: 92 FL (ref 82–98)
MONOCYTES # BLD AUTO: 0.4 K/UL (ref 0.3–1)
MONOCYTES NFR BLD: 9.1 % (ref 4–15)
NEUTROPHILS # BLD AUTO: 2.1 K/UL (ref 1.8–7.7)
NEUTROPHILS NFR BLD: 45.9 % (ref 38–73)
NONHDLC SERPL-MCNC: 96 MG/DL
NRBC BLD-RTO: 0 /100 WBC
PLATELET # BLD AUTO: 289 K/UL (ref 150–450)
PMV BLD AUTO: 9.9 FL (ref 9.2–12.9)
POTASSIUM SERPL-SCNC: 4.6 MMOL/L (ref 3.5–5.1)
PROT SERPL-MCNC: 7.4 G/DL (ref 6–8.4)
RBC # BLD AUTO: 4.58 M/UL (ref 4–5.4)
SODIUM SERPL-SCNC: 141 MMOL/L (ref 136–145)
TRIGL SERPL-MCNC: 39 MG/DL (ref 30–150)
TSH SERPL DL<=0.005 MIU/L-ACNC: 1.7 UIU/ML (ref 0.4–4)
WBC # BLD AUTO: 4.64 K/UL (ref 3.9–12.7)

## 2025-02-12 PROCEDURE — 3078F DIAST BP <80 MM HG: CPT | Mod: CPTII,S$GLB,, | Performed by: INTERNAL MEDICINE

## 2025-02-12 PROCEDURE — 85025 COMPLETE CBC W/AUTO DIFF WBC: CPT | Performed by: INTERNAL MEDICINE

## 2025-02-12 PROCEDURE — 80053 COMPREHEN METABOLIC PANEL: CPT | Performed by: INTERNAL MEDICINE

## 2025-02-12 PROCEDURE — 1159F MED LIST DOCD IN RCRD: CPT | Mod: CPTII,S$GLB,, | Performed by: INTERNAL MEDICINE

## 2025-02-12 PROCEDURE — 80061 LIPID PANEL: CPT | Performed by: INTERNAL MEDICINE

## 2025-02-12 PROCEDURE — 36415 COLL VENOUS BLD VENIPUNCTURE: CPT | Performed by: INTERNAL MEDICINE

## 2025-02-12 PROCEDURE — 99396 PREV VISIT EST AGE 40-64: CPT | Mod: S$GLB,,, | Performed by: INTERNAL MEDICINE

## 2025-02-12 PROCEDURE — 99999 PR PBB SHADOW E&M-EST. PATIENT-LVL IV: CPT | Mod: PBBFAC,,, | Performed by: INTERNAL MEDICINE

## 2025-02-12 PROCEDURE — 84443 ASSAY THYROID STIM HORMONE: CPT | Performed by: INTERNAL MEDICINE

## 2025-02-12 PROCEDURE — 3008F BODY MASS INDEX DOCD: CPT | Mod: CPTII,S$GLB,, | Performed by: INTERNAL MEDICINE

## 2025-02-12 PROCEDURE — 3074F SYST BP LT 130 MM HG: CPT | Mod: CPTII,S$GLB,, | Performed by: INTERNAL MEDICINE

## 2025-02-12 RX ORDER — LORAZEPAM 0.5 MG/1
0.5 TABLET ORAL DAILY PRN
Qty: 30 TABLET | Refills: 0 | Status: SHIPPED | OUTPATIENT
Start: 2025-02-12 | End: 2025-03-14

## 2025-02-12 NOTE — ASSESSMENT & PLAN NOTE
Sees psych NP Naresh Crews. Stable on Buspar with ativan PRN.  Has been having lots of panic attacks. No SI/HI. No longer on bupropion. Father is on hospice.

## 2025-02-12 NOTE — TELEPHONE ENCOUNTER
----- Message from Luna sent at 2/12/2025  8:08 AM CST -----  Good Morning,  Please call pt to schedule MMG and US. Thanks

## 2025-02-12 NOTE — ASSESSMENT & PLAN NOTE
Had complex migraine.  Has maxalt PRN.  Typically stress induced.  Has not had one lately.  Sees Dr. Heath.

## 2025-02-12 NOTE — PROGRESS NOTES
[facility] Note    Chief Complaint      Chief Complaint   Patient presents with    Annual Exam     History of Present Illness      Tobi Dennies is a 45 y.o. female who presents today for Annual preventative visit.  Patient comes to appointment alone.  Psych: Naresh Crews, GYN: Katherine Bocanegra, Neuro: Ivaan    Dad is on hospice, has a lot going on.    Problem List Items Addressed This Visit       ADHD (attention deficit hyperactivity disorder), combined type    Current Assessment & Plan     Stable on Adderall XR 10 mg. sees psych NP Naresh Crews.  Previously on vyvanse.         Generalized anxiety disorder    Current Assessment & Plan     Sees psych NP Naresh Crews. Stable on Buspar with ativan PRN.  Has been having lots of panic attacks. No SI/HI. No longer on bupropion. Father is on hospice.         Relevant Medications    LORazepam (ATIVAN) 0.5 MG tablet    Other Relevant Orders    TSH    Migraine without status migrainosus, not intractable    Current Assessment & Plan     Had complex migraine.  Has maxalt PRN.  Typically stress induced.  Has not had one lately.  Sees Dr. Heath.         Mild intermittent asthma without complication    Current Assessment & Plan     Stable on albuterol PRN, no SOB/wheeze.  Has not needed inhaler lately.          Other Visit Diagnoses       Annual physical exam    -  Primary    Relevant Orders    CBC Auto Differential    Lipid Panel    Comprehensive Metabolic Panel    Benign breast cyst in female, left        Relevant Orders    US Breast Left Limited    Mammo Digital Diagnostic Bilat with [facility]    Encounter for screening mammogram for malignant neoplasm of breast        Encounter for screening for malignant neoplasm of breast, unspecified screening modality        Relevant Orders    Mammo Digital Diagnostic Bilat with [facility]    Encounter for colorectal cancer screening        Relevant Orders    Cologuard Screening (Multitarget Stool DNA)                 Health Maintenance   Topic Date Due    TETANUS VACCINE  Never done    Colorectal Cancer Screening  Never done    Mammogram  03/08/2024    COVID-19 Vaccine (3 - 2024-25 season) 09/01/2024    Lipid Panel  11/12/2027    Cervical Cancer Screening  12/14/2028    RSV Vaccine (Age 60+ and Pregnant patients) (1 - 1-dose 75+ series) 01/30/2053    Hepatitis C Screening  Completed    HIV Screening  Completed    Influenza Vaccine  Addressed    Pneumococcal Vaccines (Age 0-49)  Aged Out       Past Medical History:   Diagnosis Date    Anxiety 2010    Asthma     age 4       Past Surgical History:   Procedure Laterality Date    cyst removal of scalp         family history includes ADD / ADHD (age of onset: 8) in her daughter. She was adopted.    Social History     Tobacco Use    Smoking status: Never    Smokeless tobacco: Never   Substance Use Topics    Alcohol use: Not Currently     Comment: socially; twice per month    Drug use: Never       Review of Systems   Constitutional:  Negative for chills and fever.   HENT:  Negative for hearing loss.    Eyes:  Negative for discharge.   Respiratory:  Negative for cough, shortness of breath and wheezing.    Cardiovascular:  Negative for chest pain and palpitations.   Gastrointestinal:  Negative for blood in stool, constipation, diarrhea, nausea and vomiting.   Genitourinary:  Negative for dysuria and hematuria.   Musculoskeletal:  Negative for falls and neck pain.   Neurological:  Negative for weakness and headaches.   Endo/Heme/Allergies:  Negative for polydipsia.        Outpatient Encounter Medications as of 2/12/2025   Medication Sig Dispense Refill    albuterol (PROVENTIL/VENTOLIN HFA) 90 mcg/actuation inhaler INHALE 2 PUFFS BY MOUTH EVERY 6 HOURS AS NEEDED FOR WHEEZING 25.5 g 3    busPIRone (BUSPAR) 15 MG tablet Take 1 tablet (15 mg total) by mouth 2 (two) times a day. 60 tablet 5    dextroamphetamine-amphetamine (ADDERALL XR) 10 MG 24 hr capsule Take 1 capsule (10 mg total)  "by mouth every morning. 30 capsule 0    levonorgestreL (MIRENA) 20 mcg/24 hours (6 yrs) 52 mg IUD 1 each by Intrauterine route once.      multivitamin (THERAGRAN) per tablet Take 1 tablet by mouth once daily.      rizatriptan (MAXALT-MLT) 5 MG disintegrating tablet Take at onset of migraine, can repeat in 2 hrs if needed.  No more than 2 tabs per day or 3 days/wk. 12 tablet 3    tiZANidine (ZANAFLEX) 4 MG tablet       dextroamphetamine-amphetamine (ADDERALL XR) 10 MG 24 hr capsule Take 1 capsule (10 mg total) by mouth every morning. 30 capsule 0    LORazepam (ATIVAN) 0.5 MG tablet Take 1 tablet (0.5 mg total) by mouth daily as needed for Anxiety. 30 tablet 0     No facility-administered encounter medications on file as of 2/12/2025.        Review of patient's allergies indicates:  No Known Allergies    Physical Exam      Vital Signs  Pulse: 89  SpO2: 98 %  BP: 108/70  Pain Score: 0-No pain  Height and Weight  Height: 5' 1" (154.9 cm)  Weight: 47.4 kg (104 lb 8 oz)  BSA (Calculated - sq m): 1.43 sq meters  BMI (Calculated): 19.8  Weight in (lb) to have BMI = 25: 132]    Physical Exam  Constitutional:       Appearance: She is well-developed.   HENT:      Head: Normocephalic and atraumatic.   Cardiovascular:      Rate and Rhythm: Normal rate and regular rhythm.      Heart sounds: Normal heart sounds. No murmur heard.  Pulmonary:      Effort: Pulmonary effort is normal. No respiratory distress.      Breath sounds: Normal breath sounds.   Abdominal:      General: There is no distension.      Palpations: Abdomen is soft.      Tenderness: There is no abdominal tenderness. There is no guarding.   Skin:     General: Skin is warm and dry.   Neurological:      Mental Status: She is alert. Mental status is at baseline.   Psychiatric:         Behavior: Behavior normal.          Laboratory:  CBC:  No results for input(s): "WBC", "RBC", "HGB", "HCT", "PLT", "MCV", "MCH", "MCHC" in the last 2160 hours.  CMP:  No results for " "input(s): "GLU", "CALCIUM", "ALBUMIN", "PROT", "NA", "K", "CO2", "CL", "BUN", "ALKPHOS", "ALT", "AST", "BILITOT" in the last 2160 hours.    Invalid input(s): "CREATININ"  URINALYSIS:  No results for input(s): "COLORU", "CLARITYU", "SPECGRAV", "PHUR", "PROTEINUA", "GLUCOSEU", "BILIRUBINCON", "BLOODU", "WBCU", "RBCU", "BACTERIA", "MUCUS", "NITRITE", "LEUKOCYTESUR", "UROBILINOGEN", "HYALINECASTS" in the last 2160 hours.   LIPIDS:  No results for input(s): "TSH", "HDL", "CHOL", "TRIG", "LDLCALC", "CHOLHDL", "NONHDLCHOL", "TOTALCHOLEST" in the last 2160 hours.  TSH:  No results for input(s): "TSH" in the last 2160 hours.  A1C:  No results for input(s): "HGBA1C" in the last 2160 hours.    Radiology:  No results found in the last 30 days.     Assessment/Plan     Tobi Dennies is a 45 y.o.female with:    1. Annual physical exam  - CBC Auto Differential; Future  - Lipid Panel; Future  - Comprehensive Metabolic Panel; Future    2. Generalized anxiety disorder  - TSH; Future  - LORazepam (ATIVAN) 0.5 MG tablet; Take 1 tablet (0.5 mg total) by mouth daily as needed for Anxiety.  Dispense: 30 tablet; Refill: 0    3. ADHD (attention deficit hyperactivity disorder), combined type    4. Mild intermittent asthma without complication    5. Benign breast cyst in female, left  - US Breast Left Limited; Future  - Mammo Digital Diagnostic Bilat with [facility]; Future    6. Encounter for screening mammogram for malignant neoplasm of breast    7. Encounter for screening for malignant neoplasm of breast, unspecified screening modality  - Mammo Digital Diagnostic Bilat with [facility]; Future    8. Other migraine without status migrainosus, not intractable    9. Encounter for colorectal cancer screening  - Cologuard Screening (Multitarget Stool DNA); Future  - Cologuard Screening (Multitarget Stool DNA)      -labs ordered  -declines cscope at this time, will do cologuard  -Continue current medications and maintain follow up with specialists.    -Follow " up in about 1 year (around 2/12/2026) for Annual Visit.       Meryl Hall MD  [facility]                      Answers submitted by the patient for this visit:  Review of Systems Questionnaire (Submitted on 2/5/2025)  activity change: No  unexpected weight change: No  rhinorrhea: No  trouble swallowing: No  visual disturbance: No  chest tightness: No  polyuria: No  difficulty urinating: No  menstrual problem: No  joint swelling: No  arthralgias: No  confusion: No  dysphoric mood: No

## 2025-02-26 ENCOUNTER — RESULTS FOLLOW-UP (OUTPATIENT)
Dept: PRIMARY CARE CLINIC | Facility: CLINIC | Age: 45
End: 2025-02-26

## 2025-02-26 ENCOUNTER — HOSPITAL ENCOUNTER (OUTPATIENT)
Dept: RADIOLOGY | Facility: HOSPITAL | Age: 45
Discharge: HOME | End: 2025-02-26
Attending: INTERNAL MEDICINE
Payer: COMMERCIAL

## 2025-02-26 DIAGNOSIS — Z12.39 ENCOUNTER FOR SCREENING FOR MALIGNANT NEOPLASM OF BREAST, UNSPECIFIED SCREENING MODALITY: ICD-10-CM

## 2025-02-26 DIAGNOSIS — N60.02 BENIGN BREAST CYST IN FEMALE, LEFT: ICD-10-CM

## 2025-02-26 PROCEDURE — 77066 DX MAMMO INCL CAD BI: CPT | Mod: 26,,, | Performed by: RADIOLOGY

## 2025-02-26 PROCEDURE — 76642 ULTRASOUND BREAST LIMITED: CPT | Mod: TC,LT

## 2025-02-26 PROCEDURE — 76642 ULTRASOUND BREAST LIMITED: CPT | Mod: 26,LT,, | Performed by: RADIOLOGY

## 2025-02-26 PROCEDURE — 77062 BREAST TOMOSYNTHESIS BI: CPT | Mod: TC

## 2025-02-26 PROCEDURE — 77062 BREAST TOMOSYNTHESIS BI: CPT | Mod: 26,,, | Performed by: RADIOLOGY

## 2025-03-11 ENCOUNTER — OFFICE VISIT (OUTPATIENT)
Dept: PSYCHIATRY | Facility: CLINIC | Age: 45
End: 2025-03-11
Payer: COMMERCIAL

## 2025-03-11 DIAGNOSIS — F90.2 ADHD (ATTENTION DEFICIT HYPERACTIVITY DISORDER), COMBINED TYPE: ICD-10-CM

## 2025-03-11 DIAGNOSIS — F41.1 GENERALIZED ANXIETY DISORDER: Primary | ICD-10-CM

## 2025-03-11 DIAGNOSIS — F32.A DEPRESSION, UNSPECIFIED DEPRESSION TYPE: ICD-10-CM

## 2025-03-11 PROCEDURE — 98006 SYNCH AUDIO-VIDEO EST MOD 30: CPT | Mod: 95,,, | Performed by: NURSE PRACTITIONER

## 2025-03-11 RX ORDER — DEXTROAMPHETAMINE SACCHARATE, AMPHETAMINE ASPARTATE MONOHYDRATE, DEXTROAMPHETAMINE SULFATE AND AMPHETAMINE SULFATE 2.5; 2.5; 2.5; 2.5 MG/1; MG/1; MG/1; MG/1
10 CAPSULE, EXTENDED RELEASE ORAL EVERY MORNING
Qty: 30 CAPSULE | Refills: 0 | Status: SHIPPED | OUTPATIENT
Start: 2025-05-13 | End: 2025-06-12

## 2025-03-11 RX ORDER — DEXTROAMPHETAMINE SACCHARATE, AMPHETAMINE ASPARTATE MONOHYDRATE, DEXTROAMPHETAMINE SULFATE AND AMPHETAMINE SULFATE 2.5; 2.5; 2.5; 2.5 MG/1; MG/1; MG/1; MG/1
10 CAPSULE, EXTENDED RELEASE ORAL EVERY MORNING
Qty: 30 CAPSULE | Refills: 0 | Status: SHIPPED | OUTPATIENT
Start: 2025-03-18 | End: 2025-04-17

## 2025-03-11 RX ORDER — DEXTROAMPHETAMINE SACCHARATE, AMPHETAMINE ASPARTATE MONOHYDRATE, DEXTROAMPHETAMINE SULFATE AND AMPHETAMINE SULFATE 2.5; 2.5; 2.5; 2.5 MG/1; MG/1; MG/1; MG/1
10 CAPSULE, EXTENDED RELEASE ORAL EVERY MORNING
Qty: 30 CAPSULE | Refills: 0 | Status: SHIPPED | OUTPATIENT
Start: 2025-04-15 | End: 2025-05-15

## 2025-03-11 RX ORDER — FLUOXETINE 10 MG/1
CAPSULE ORAL
Qty: 60 CAPSULE | Refills: 3 | Status: SHIPPED | OUTPATIENT
Start: 2025-03-11

## 2025-03-11 NOTE — PROGRESS NOTES
"Outpatient Psychiatry Follow-Up Visit (MD/NP)    3/11/2025    Clinical Status of Patient:  Outpatient (Ambulatory)    Chief Complaint:  Tobi Dennies is a 45 y.o. female who presents today for follow-up of depression, anxiety, and attention problems.  Met with patient.     The patient location is: [place]  The chief complaint leading to consultation is: depression/anxiety/ADHD    Visit type: audiovisual    Face to Face time with patient: 16 minutes   23 minutes of total time spent on the encounter, which includes face to face time and non-face to face time preparing to see the patient (eg, review of tests), Obtaining and/or reviewing separately obtained history, Documenting clinical information in the electronic or other health record, Independently interpreting results (not separately reported) and communicating results to the patient/family/caregiver, or Care coordination (not separately reported).       Each patient to whom he or she provides medical services by telemedicine is:  (1) informed of the relationship between the physician and patient and the respective role of any other health care provider with respect to management of the patient; and (2) notified that he or she may decline to receive medical services by telemedicine and may withdraw from such care at any time.    Notes:     Interval History and Content of Current Session:    Social/medical updates -- continues in same residence with boyfriend and her daughter. Father now in hospice care.    "Tough, they put my Dad on hospice."    Mood -- dysphoria and crying spells associated with father's medical deterioration. Also notes decreased motivation to get out and do things, though does find enjoyment in activities when motivated by her boyfriend. Aunt is a positive emotional support. No thoughts of death or SI.   Anxiety -- increased in context of current stressors. Work is a positive distraction, gets her mind off her father   Attention -- has good " control of inattention associated with symptoms of making careless mistakes, failure to complete tasks, disorganization, avoids tasks requiring sustained attention, forgetfulness, distractibility   Psychosis -- no  Sleep -- fairly adequate  Energy -- adequate   Appetite -- adequate of late, weight in low 100s.    Substance use -- sporadic ETOH socially. No illicit substance use.     Medications:     Adderall XR 10 mg daily -- takes on workdays, denies SE  Buspirone 15 mg BID -- takes 1x daily, denies SE, discontinue    Start: Fluoxetine 10 mg x1 week, then 20 mg daily     Lorazepam 0.5 mg PRN -- rare demand, old script    OTC -- melatonin, multivitamins     Previous medication trials: lexapro, zoloft, bupropion, ativan, trazodone      Brief synopsis:  Dysphoria, anxiety, no SI/HI/AVH    Review of Systems   PSYCHIATRIC: Pertinant items are noted in the narrative.  CONSTITUTIONAL: See above.   MUSCULOSKELETAL: No pain or stiffness of the joints.  NEUROLOGIC: No weakness, sensory changes, seizures, confusion, memory loss, tremor or other abnormal movements.  GASTROINTESTINAL: No nausea, vomiting, pain, constipation or diarrhea.    Past Medical, Family and Social History: The patient's past medical, family and social history have been reviewed and updated as appropriate within the electronic medical record - see encounter notes.    Compliance: see above    Side effects: see above    Risk Parameters:  Patient reports no suicidal ideation  Patient reports no homicidal ideation  Patient reports no self-injurious behavior  Patient reports no violent behavior    Exam (detailed: at least 9 elements; comprehensive: all 15 elements)   Constitutional  Vitals:  Most recent vital signs, dated less than 90 days prior to this appointment, were reviewed.   There were no vitals filed for this visit.     General:  unremarkable, age appropriate,     Musculoskeletal  Muscle Strength/Tone:  not examined telemedicine     Gait & Station:   QUENTIN  telemedicine      Psychiatric  Speech:  no latency; no press   Mood & Affect:  dysphoric  full, tearful at times   Thought Process:  normal and logical   Associations:  intact   Thought Content:  normal, no suicidality, no homicidality, delusions, or paranoia   Insight:  intact   Judgement: behavior is adequate to circumstances   Orientation:  grossly intact   Memory: intact for content of interview   Language: grossly intact   Attention Span & Concentration:  able to focus   Fund of Knowledge:  intact and appropriate to age and level of education     Assessment and Diagnosis   Status/Progress: Based on the examination today, the patient's problem(s) is/are inadequately controlled.  New problems have been presented today.   Co-morbidities, Diagnostic uncertainty, and Lack of compliance are not complicating management of the primary condition.  There are no active rule-out diagnoses for this patient at this time.     General Impression: Tobi Dennies is a 45 y.o. female with a psychiatric hx of LYLY and ADHD. Medical hx is largely benign. Family MH hx significant for anxiety. Patient with psychotropic treatment of anxiety with SSRIs with minimal effect. Started on Vyvanse in 10/2022 with positive effect on attention deficit and anxiety sx. No hx of psychiatric hospitalizations. No hx of suicide attempts, self-injurious behavior, or violence. No hx of substance abuse.     04/14/23 -- sx stable. BP/HR elevated, patient attributes to rushing to appointment from [facility]. BP significantly decreased upon recheck, HR tachycardic.    07/13/23 -- multiple stressors exacerbating anxiety. Overall appears to be coping effectively. Reports Vyvanse at lower dose of 20 mg is effective. No medication changes made.    11/14/23 -- presents with sx consistent with depression, possibly SAD. Start bupropion  mg daily. Continue Adderall XR and buspirone as prescribed.     02/21/24 -- partial response to bupropion XL, will titrate  to 300 mg. Continue Adderall XR and buspirone as prescribed.     06/12/24 -- sx stable overall. Some anxiety in context of daughter's behavior. No medication changes made.    09/04/24 -- mood stable, ongoing situational anxiety associated with stressors. Inattentive sx adequately controlled. Discontinue bupropion XL at patient's request. Continue Adderall XR 10 mg daily, buspirone 15 mg BID    12/06/24 -- sx largely at baseline. Increased anxious moments directly associated with specific stressors. No medication changes made    03/11/25 -- increased dysphoria in context of father's deteriorating health, hospice treatment. Difficulty with BID compliance with buspirone. Will discontinue buspirone and start fluoxetine --> titrate to 20 mg daily. Continue Adderall XR 10 mg daily.      ICD-10-CM ICD-9-CM   1. Generalized anxiety disorder  F41.1 300.02   2. ADHD (attention deficit hyperactivity disorder), combined type  F90.2 314.01   3. Depression, unspecified depression type  F32.A 311           Intervention/Counseling/Treatment Plan   Reviewed patient's symptoms and medication regimen  Medication changes as above  Labs reviewed, unremarkable  Next visit in-person    Medication Management  Prescription drug management was employed during the encounter, as medications were prescribed, or considered but not prescribed.   [facility] reviewed  The risks and benefits of medication were discussed with the patient.  Possible expectable adverse effects of any current or proposed individual psychotropic agents were discussed with this patient.  Counseling was provided on the importance of full compliance with any prescribed medication.  Detailed instructions were provided to the patient regarding the administration of any prescribed medication.  Patient voiced understanding    Return to Clinic: 3 months or sooner if needed

## 2025-03-26 ENCOUNTER — OFFICE VISIT (OUTPATIENT)
Dept: OBSTETRICS AND GYNECOLOGY | Facility: CLINIC | Age: 45
End: 2025-03-26
Payer: COMMERCIAL

## 2025-03-26 VITALS
BODY MASS INDEX: 19.78 KG/M2 | SYSTOLIC BLOOD PRESSURE: 106 MMHG | WEIGHT: 104.75 LBS | DIASTOLIC BLOOD PRESSURE: 74 MMHG | HEIGHT: 61 IN

## 2025-03-26 DIAGNOSIS — Z30.431 ENCOUNTER FOR ROUTINE CHECKING OF INTRAUTERINE CONTRACEPTIVE DEVICE (IUD): ICD-10-CM

## 2025-03-26 DIAGNOSIS — Z01.419 ENCOUNTER FOR ANNUAL ROUTINE GYNECOLOGICAL EXAMINATION: Primary | ICD-10-CM

## 2025-03-26 DIAGNOSIS — N63.0 BREAST MASS IN FEMALE: ICD-10-CM

## 2025-03-26 DIAGNOSIS — Z12.31 BREAST CANCER SCREENING BY MAMMOGRAM: ICD-10-CM

## 2025-03-26 PROCEDURE — 99999 PR PBB SHADOW E&M-EST. PATIENT-LVL IV: CPT | Mod: PBBFAC,,, | Performed by: OBSTETRICS & GYNECOLOGY

## 2025-03-26 NOTE — PROGRESS NOTES
Chief Complaint: Well Woman Exam     HPI:      Tobi Dennies is a 45 y.o.  who presents today for well woman exam.  LMP: No LMP recorded (lmp unknown). (Menstrual status: Birth Control).  No issues, problems, or complaints. Specifically, patient denies abnormal vaginal bleeding, discharge, pelvic pain, urinary problems, or changes in appetite. Ms. Dennies is currently sexually active with a single male partner. She is currently using IUD for contraception. She declines STD screening today.    Previous Pap:  no abnormalities (2024)  Previous Mammogram:    Mammo Digital Diagnostic Bilat with Marino  Order: [id number]   Status: Final result       Next appt: 2025 at 08:00 AM in Psychiatry (Naresh Crews NP)       Dx: Benign breast cyst in female, left; E...       Linked study orders: US Breast Left Limited (Order: [id number])    Test Result Released: Yes (seen)       Messages: Seen    0 Result Notes       1 Patient Communication       View Follow-Up Encounter       1  Topic  Assessment    Overall   2 - Benign   Breast Density     Overall   Breast Composition d - Extremely dense   Details    Reading Physician Reading Date Result Priority   Juan Kan MD  [phone]  [phone]  2025 Routine     Physician Responsible for MQSA Outcome Reason    Juan Kan MD Signed      Narrative & Impression     Facility:  [facility] [address] [place] [id number] [phone]     Name: Tobi Dennies     MRN: [id number]     Result:   Mammo Digital Diagnostic Bilat with [facility]ino  US Breast Left Limited     History:  Patient is 45 y.o. and is seen for benign breast cyst in female, left.     Films Compared:  Compared to: 2023 Mammo Digital Screening Bilat w/ Marino and 2022 US Breast Bilateral Limited     Findings:  This procedure was performed using tomosynthesis. Computer-aided detection was utilized in the interpretation of this examination.        The  breasts are extremely dense, which lowers the sensitivity of mammography.      Left  US Breast Left Limited  There is a 0.9 cm x 0.4 cm x 1 cm oval complicated cyst with circumscribed margins seen in the left breast at 10 o'clock, 2 cm from the nipple. The cyst correlates with the palpable finding reported by the patient.There is a 1.5 cm benign cyst at the 12 o'clock position in the retroareolar left breast.      There is no evidence of suspicious masses or other abnormal findings in the left breast.     Mammo Digital Diagnostic Bilat with Marino  There is no evidence of suspicious masses, calcifications, or other abnormal findings in the left breast.     Right     Mammo Digital Diagnostic Bilat with Marino  There is no evidence of suspicious masses, calcifications, or other abnormal findings in the right breast.     Impression:  Bilateral  Mammo Digital Diagnostic Bilat with Marino  There is no mammographic evidence of malignancy.     Left  US Breast Left Limited  There is no sonographic evidence of malignancy.     BI-RADS Category:   Overall: 2 - Benign        Recommendation:  Routine screening mammogram in 1 year is recommended.           Your estimated lifetime risk of breast cancer (to age 85) based on Tyrer-Cuzick risk assessment model is 7.99%.  According to the American Cancer Society, patients with a lifetime breast cancer risk of 20% or higher might benefit from supplemental screening tests, such as screening breast MRI.     Electronically signed by,  Juan Kan MD                   Exam Ended: 25 08:33 CST Last Resulted: 25 08:55 CST       Most Recent Dexa: not indicated  Cologuard: pending    COVID vaccine: completed series  Gardasil:has never had     Problem List[1]    Past Medical History:   Diagnosis Date    Anxiety     Asthma     age 4    Headache        Past Surgical History:   Procedure Laterality Date    cyst removal of scalp         OB History          1    Para   1     "Term   1            AB        Living   1         SAB        IAB        Ectopic        Multiple        Live Births   1           Obstetric Comments   Menarche at 13  No abnormal pap  No STDs               ROS:     Review of Systems   Constitutional:  Negative for activity change, appetite change and fatigue.   Respiratory:  Negative for shortness of breath.    Cardiovascular:  Negative for chest pain.   Gastrointestinal:  Negative for abdominal pain, constipation and diarrhea.   Endocrine: Negative for cold intolerance and heat intolerance.   Genitourinary:  Negative for dysuria, frequency, menstrual irregularity, pelvic pain and vaginal discharge.   Integumentary:  Negative for breast mass, breast discharge and breast tenderness.   Psychiatric/Behavioral:  Negative for dysphoric mood. The patient is not nervous/anxious.    Breast: Negative for mass and tenderness      Physical Exam:      PHYSICAL EXAM:  /74   Ht 5' 1" (1.549 m)   Wt 47.5 kg (104 lb 11.5 oz)   LMP  (LMP Unknown) Comment: Mirena (2017)  BMI 19.79 kg/m²   Body mass index is 19.79 kg/m².     APPEARANCE: Well nourished, well developed, in no acute distress.  PSYCH: Appropriate mood and affect.  SKIN: No acne or hirsutism  NECK: Neck symmetric without masses or thyromegaly  NODES: No inguinal, axillary, or supraclavicular lymph node enlargement  ABDOMEN: Soft.  No tenderness or masses.    CARDIOVASCULAR: No edema of peripheral extremities  BREASTS: Symmetrical, no skin changes or visible lesions.  No palpable masses or nipple discharge bilaterally.  PELVIC: Normal external genitalia without lesions.  Normal hair distribution.  Adequate perineal body, normal urethral meatus.  Vagina moist and well rugated without lesions or discharge.  Cervix pink, without lesions, discharge or tenderness. IUD strings at os. No significant cystocele or rectocele.  Bimanual exam shows uterus to be normal size, regular, mobile and nontender.  Adnexa " without masses or tenderness.      Assessment:     1. Encounter for annual routine gynecological examination        2. Encounter for routine checking of intrauterine contraceptive device (IUD)  Device Authorization Order      3. Breast cancer screening by mammogram  CANCELED: Mammo Digital Screening Bilat w/ Marino (XPD)      4. Breast mass in female  Mammo Digital Diagnostic Bilat with Marino (XPD)    US Breast Bilateral Limited            Plan:     Clinical breast exam performed.  Pap UTD.  Mammogram reviewed, ordered for next year. Patient desires to continue diagnostic imaging due breast masses.   DEXA at 63.  Colon cancer screening per PCP.  Contraception: Mirena IUD, placed 2017.  Discussed removal and replacement. Device ordered.  Follow up in about 1 year (around 3/26/2026) for annual well woman exam or as needed.    Counseling:     Patient was counseled today on current ASCCP pap guidelines, the recommendation for yearly pelvic exams, healthy diet and exercise routines, breast self awareness and annual mammograms.She is to see her PCP for other health maintenance.     Use of the [facility] Patient Portal discussed and encouraged during today's visit.         Katherine Bocanegra MD  [facility]  03/26/2025               [1]   Patient Active Problem List  Diagnosis    Mild intermittent asthma without complication    Generalized anxiety disorder    Seasonal allergies    Insomnia    ADHD (attention deficit hyperactivity disorder), combined type    Migraine without status migrainosus, not intractable

## 2025-03-27 ENCOUNTER — PATIENT MESSAGE (OUTPATIENT)
Dept: OBSTETRICS AND GYNECOLOGY | Facility: CLINIC | Age: 45
End: 2025-03-27
Payer: COMMERCIAL

## 2025-04-09 ENCOUNTER — TELEPHONE (OUTPATIENT)
Dept: OBSTETRICS AND GYNECOLOGY | Facility: CLINIC | Age: 45
End: 2025-04-09
Payer: COMMERCIAL

## 2025-04-09 NOTE — TELEPHONE ENCOUNTER
Called to inform patient that her IUD is not covered by insurance and confirm whether she has any other options in mind. No answer . Left message.

## 2025-04-16 ENCOUNTER — PATIENT MESSAGE (OUTPATIENT)
Dept: OBSTETRICS AND GYNECOLOGY | Facility: CLINIC | Age: 45
End: 2025-04-16
Payer: COMMERCIAL

## 2025-06-25 ENCOUNTER — OFFICE VISIT (OUTPATIENT)
Dept: PSYCHIATRY | Facility: CLINIC | Age: 45
End: 2025-06-25
Payer: COMMERCIAL

## 2025-06-25 VITALS
DIASTOLIC BLOOD PRESSURE: 77 MMHG | HEART RATE: 108 BPM | SYSTOLIC BLOOD PRESSURE: 119 MMHG | BODY MASS INDEX: 19.6 KG/M2 | WEIGHT: 103.75 LBS

## 2025-06-25 DIAGNOSIS — F32.A DEPRESSION, UNSPECIFIED DEPRESSION TYPE: ICD-10-CM

## 2025-06-25 DIAGNOSIS — F90.2 ADHD (ATTENTION DEFICIT HYPERACTIVITY DISORDER), COMBINED TYPE: ICD-10-CM

## 2025-06-25 DIAGNOSIS — F41.1 GENERALIZED ANXIETY DISORDER: Primary | ICD-10-CM

## 2025-06-25 DIAGNOSIS — F41.1 GENERALIZED ANXIETY DISORDER: ICD-10-CM

## 2025-06-25 PROCEDURE — 99215 OFFICE O/P EST HI 40 MIN: CPT | Mod: S$GLB,,, | Performed by: NURSE PRACTITIONER

## 2025-06-25 PROCEDURE — 99999 PR PBB SHADOW E&M-EST. PATIENT-LVL II: CPT | Mod: PBBFAC,,, | Performed by: NURSE PRACTITIONER

## 2025-06-25 PROCEDURE — 3008F BODY MASS INDEX DOCD: CPT | Mod: CPTII,S$GLB,, | Performed by: NURSE PRACTITIONER

## 2025-06-25 PROCEDURE — 3078F DIAST BP <80 MM HG: CPT | Mod: CPTII,S$GLB,, | Performed by: NURSE PRACTITIONER

## 2025-06-25 PROCEDURE — 3074F SYST BP LT 130 MM HG: CPT | Mod: CPTII,S$GLB,, | Performed by: NURSE PRACTITIONER

## 2025-06-25 RX ORDER — DEXTROAMPHETAMINE SACCHARATE, AMPHETAMINE ASPARTATE MONOHYDRATE, DEXTROAMPHETAMINE SULFATE AND AMPHETAMINE SULFATE 2.5; 2.5; 2.5; 2.5 MG/1; MG/1; MG/1; MG/1
10 CAPSULE, EXTENDED RELEASE ORAL EVERY MORNING
Qty: 30 CAPSULE | Refills: 0 | Status: SHIPPED | OUTPATIENT
Start: 2025-07-23 | End: 2025-08-22

## 2025-06-25 RX ORDER — LORAZEPAM 0.5 MG/1
0.5 TABLET ORAL DAILY PRN
Qty: 30 TABLET | Refills: 5 | Status: SHIPPED | OUTPATIENT
Start: 2025-06-25

## 2025-06-25 RX ORDER — DEXTROAMPHETAMINE SACCHARATE, AMPHETAMINE ASPARTATE MONOHYDRATE, DEXTROAMPHETAMINE SULFATE AND AMPHETAMINE SULFATE 2.5; 2.5; 2.5; 2.5 MG/1; MG/1; MG/1; MG/1
10 CAPSULE, EXTENDED RELEASE ORAL EVERY MORNING
Qty: 30 CAPSULE | Refills: 0 | Status: SHIPPED | OUTPATIENT
Start: 2025-08-20 | End: 2025-09-19

## 2025-06-25 RX ORDER — BUSPIRONE HYDROCHLORIDE 15 MG/1
15 TABLET ORAL 2 TIMES DAILY
Qty: 60 TABLET | Refills: 5 | Status: SHIPPED | OUTPATIENT
Start: 2025-06-25

## 2025-06-25 RX ORDER — DEXTROAMPHETAMINE SACCHARATE, AMPHETAMINE ASPARTATE MONOHYDRATE, DEXTROAMPHETAMINE SULFATE AND AMPHETAMINE SULFATE 2.5; 2.5; 2.5; 2.5 MG/1; MG/1; MG/1; MG/1
10 CAPSULE, EXTENDED RELEASE ORAL EVERY MORNING
Qty: 30 CAPSULE | Refills: 0 | Status: SHIPPED | OUTPATIENT
Start: 2025-06-25 | End: 2025-07-25

## 2025-06-25 NOTE — PROGRESS NOTES
Outpatient Psychiatry Follow-Up Visit (MD/NP)    6/25/2025    Clinical Status of Patient:  Outpatient (Ambulatory)    Chief Complaint:  Tobi Dennies is a 45 y.o. female who presents today for follow-up of depression, anxiety, and attention problems.  Met with patient.     Interval History and Content of Current Session:    Social/medical updates -- father passed away in April    Mood -- describes normative grief associated with her father's death. Sadness comes in waves, frequent crying spells when prompted by reminders of her father. Aunt has been a strong emotional support. No thoughts of death or SI.  Anxiety -- exacerbated given recent stressors. Frequent has anxiety attacks, typically 1x daily, in response to stressors. Has multiple different coping skills she attempts to utilize, distractions, fidget spinner, breathing techniques, positive affirmations. Sporadically takes PRN lorazepam when these skills do not help calm anxiety.   Attention -- has good control of inattention associated with symptoms of making careless mistakes, failure to complete tasks, disorganization, avoids tasks requiring sustained attention, forgetfulness, distractibility   Psychosis -- no  Sleep -- poor/fair, disturbed by cat  Energy -- adequate  Appetite -- adequate, weight relatively stable, 103 lb today    Substance use -- no change on ETOH use, sporadic socially with dinner. No illicit substance use.    Medications:     Adderall XR 10 mg daily -- takes on workdays, denies SE  Fluoxetine 20 mg daily-- heart palpitations, stopped taking, discontinue    Restart: buspirone 7.5 mg BID x1 week, then 15 mg daily     PCP Lorazepam 0.5 mg PRN -- increased demand surrounding fathers death, now 0-2x weekly, last filled 02/12/25    OTC -- melatonin, multivitamins     Previous medication trials: escitalopram (heart palpitations), sertraline, fluoxetine (heart palpitations), bupropion, buspirone, ativan, trazodone    Screening tools:  06/25/25 --  PHQ-9 = 11  LYLY-7 = 12    Brief synopsis:  Grief, LYLY, no SI/HI/AVH    Review of Systems   PSYCHIATRIC: Pertinant items are noted in the narrative.  CONSTITUTIONAL: See above.   MUSCULOSKELETAL: No pain or stiffness of the joints.  NEUROLOGIC: No weakness, sensory changes, seizures, confusion, memory loss, tremor or other abnormal movements.  GASTROINTESTINAL: No nausea, vomiting, pain, constipation or diarrhea.    Past Medical, Family and Social History: The patient's past medical, family and social history have been reviewed and updated as appropriate within the electronic medical record - see encounter notes.    Resides with boyfriend and her daughter (26 y/o)  Employed as a   Father passed away in April 2025  Strained relationship with mother     Compliance: see above    Side effects: see above    Risk Parameters:  Patient reports no suicidal ideation  Patient reports no homicidal ideation  Patient reports no self-injurious behavior  Patient reports no violent behavior    Exam (detailed: at least 9 elements; comprehensive: all 15 elements)   Constitutional  Vitals:  Most recent vital signs, dated less than 90 days prior to this appointment, were reviewed.   Vitals:    06/25/25 0806   BP: 119/77   Pulse: 108   Weight: 47 kg (103 lb 11.6 oz)        General:  unremarkable, age appropriate,     Musculoskeletal  Muscle Strength/Tone:  no spasicity, no rigidity    Gait & Station:  non-ataxic      Psychiatric  Speech:  no latency; no press   Mood & Affect:  dysphoric, anxious  full, tearful at times   Thought Process:  normal and logical   Associations:  intact   Thought Content:  normal, no suicidality, no homicidality, delusions, or paranoia   Insight:  intact   Judgement: behavior is adequate to circumstances   Orientation:  grossly intact   Memory: intact for content of interview   Language: grossly intact   Attention Span & Concentration:  able to focus   Fund of Knowledge:  intact and appropriate to age  and level of education     Assessment and Diagnosis   Status/Progress: Based on the examination today, the patient's problem(s) is/are inadequately controlled.  New problems have been presented today.   Co-morbidities, Diagnostic uncertainty, and Lack of compliance are not complicating management of the primary condition.  There are no active rule-out diagnoses for this patient at this time.     General Impression: Tobi Dennies is a 45 y.o. female with a psychiatric hx of LYLY and ADHD. Medical hx is largely benign. Family MH hx significant for anxiety. Patient with psychotropic treatment of anxiety with SSRIs with minimal effect. Started on Vyvanse in 10/2022 with positive effect on attention deficit and anxiety sx. No hx of psychiatric hospitalizations. No hx of suicide attempts, self-injurious behavior, or violence. No hx of substance abuse.     04/14/23 -- sx stable. BP/HR elevated, patient attributes to rushing to appointment from garage. BP significantly decreased upon recheck, HR tachycardic.    07/13/23 -- multiple stressors exacerbating anxiety. Overall appears to be coping effectively. Reports Vyvanse at lower dose of 20 mg is effective. No medication changes made.    11/14/23 -- presents with sx consistent with depression, possibly SAD. Start bupropion  mg daily. Continue Adderall XR and buspirone as prescribed.     02/21/24 -- partial response to bupropion XL, will titrate to 300 mg. Continue Adderall XR and buspirone as prescribed.     06/12/24 -- sx stable overall. Some anxiety in context of daughter's behavior. No medication changes made.    09/04/24 -- mood stable, ongoing situational anxiety associated with stressors. Inattentive sx adequately controlled. Discontinue bupropion XL at patient's request. Continue Adderall XR 10 mg daily, buspirone 15 mg BID    12/06/24 -- sx largely at baseline. Increased anxious moments directly associated with specific stressors. No medication changes  made    03/11/25 -- increased dysphoria in context of father's deteriorating health, hospice treatment. Difficulty with BID compliance with buspirone. Will discontinue buspirone and start fluoxetine --> titrate to 20 mg daily. Continue Adderall XR 10 mg daily.    06/25/25 -- presents with normative grief in context of father's passing. LYLY sx remain evident, unable to tolerate fluoxetine. Discussed restarting buspirone --> titrate to 15 mg BID, encouraged full compliance. Continue Adderall XR 10 mg daily.      ICD-10-CM ICD-9-CM   1. Generalized anxiety disorder  F41.1 300.02   2. ADHD (attention deficit hyperactivity disorder), combined type  F90.2 314.01   3. Depression, unspecified depression type  F32.A 311       Intervention/Counseling/Treatment Plan   Reviewed patient's symptoms and medication regimen  Medication changes as above  Can titrate buspirone if needed   Discussed importance of maintaining full compliance with buspirone to address anxiety sx  Pharmacogenomic testing ordered due to adverse effects with multiple SSRI trials  Discussed benefits of psychotherapy   Patient declines at this time, feels well-supported by aunt and boyfriend    Medication Management  Prescription drug management was employed during the encounter, as medications were prescribed, or considered but not prescribed.   [facility] reviewed  The risks and benefits of medication were discussed with the patient.  Possible expectable adverse effects of any current or proposed individual psychotropic agents were discussed with this patient.  Counseling was provided on the importance of full compliance with any prescribed medication.  Detailed instructions were provided to the patient regarding the administration of any prescribed medication.  The most common and rare side effects were reviewed, including discussion of potential permanent movement disorder and disfiguring conditions if applicable to any prescribed medication  The  medication plan was developed through shared decision-making with the patient, with consideration of the efficacy and safety of the medications and the patients preferences in treatment   Patient voiced understanding    Return to Clinic: 3 months or sooner if needed    I spent a total of 41 minutes on the day of the visit. This includes face to face time and non-face to face time preparing to see the patient (eg, review of tests), obtaining and/or reviewing separately obtained history, documenting clinical information in the electronic or other health record, independently interpreting results and communicating results to the patient/family/caregiver, or care coordinator.

## 2025-06-25 NOTE — TELEPHONE ENCOUNTER
No care due was identified.  [facility] Embedded Care Due Messages. Reference number: [id number].   6/25/2025 9:15:43 AM CDT

## 2025-08-21 ENCOUNTER — PATIENT MESSAGE (OUTPATIENT)
Dept: OBSTETRICS AND GYNECOLOGY | Facility: CLINIC | Age: 45
End: 2025-08-21
Payer: COMMERCIAL

## 2025-09-05 ENCOUNTER — TELEPHONE (OUTPATIENT)
Dept: OBSTETRICS AND GYNECOLOGY | Facility: CLINIC | Age: 45
End: 2025-09-05